# Patient Record
Sex: FEMALE | Race: WHITE | Employment: FULL TIME | URBAN - METROPOLITAN AREA
[De-identification: names, ages, dates, MRNs, and addresses within clinical notes are randomized per-mention and may not be internally consistent; named-entity substitution may affect disease eponyms.]

---

## 2017-01-17 ENCOUNTER — HOSPITAL ENCOUNTER (OUTPATIENT)
Dept: CT IMAGING | Age: 62
Discharge: HOME | End: 2017-01-17
Attending: INTERNAL MEDICINE | Admitting: INTERNAL MEDICINE

## 2017-01-17 DIAGNOSIS — C20 RECTAL CANCER METASTASIZED TO LUNG (HCC): Chronic | ICD-10-CM

## 2017-01-17 DIAGNOSIS — C18.2 MALIGNANT NEOPLASM OF ASCENDING COLON (HCC): ICD-10-CM

## 2017-01-17 DIAGNOSIS — C78.00 RECTAL CANCER METASTASIZED TO LUNG (HCC): Chronic | ICD-10-CM

## 2017-01-17 LAB
GFR AFRICAN AMERICAN: >60
GFR NON-AFRICAN AMERICAN: >60
PERFORMED ON: NORMAL
POC CREATININE: 0.6 MG/DL (ref 0.6–1.2)
POC SAMPLE TYPE: NORMAL

## 2017-01-30 ENCOUNTER — HOSPITAL ENCOUNTER (OUTPATIENT)
Dept: CT IMAGING | Age: 62
Discharge: HOME | End: 2017-01-30
Attending: INTERNAL MEDICINE | Admitting: INTERNAL MEDICINE

## 2017-01-30 VITALS
HEIGHT: 67 IN | OXYGEN SATURATION: 98 % | BODY MASS INDEX: 25.45 KG/M2 | SYSTOLIC BLOOD PRESSURE: 146 MMHG | DIASTOLIC BLOOD PRESSURE: 76 MMHG | HEART RATE: 75 BPM | RESPIRATION RATE: 16 BRPM | TEMPERATURE: 97.6 F | WEIGHT: 162.15 LBS

## 2017-01-30 DIAGNOSIS — C20 RECTAL CANCER METASTASIZED TO LUNG (HCC): Chronic | ICD-10-CM

## 2017-01-30 DIAGNOSIS — C20 MALIGNANT NEOPLASM OF RECTUM (HCC): ICD-10-CM

## 2017-01-30 DIAGNOSIS — C78.00 RECTAL CANCER METASTASIZED TO LUNG (HCC): Chronic | ICD-10-CM

## 2017-01-30 LAB
INR BLD: 0.96 (ref 0.85–1.16)
PLATELET # BLD: 101 K/UL (ref 135–450)
PROTHROMBIN TIME: 10.9 SEC (ref 9.8–13)

## 2017-01-30 RX ORDER — FENTANYL CITRATE 50 UG/ML
INJECTION, SOLUTION INTRAMUSCULAR; INTRAVENOUS DAILY PRN
Status: COMPLETED | OUTPATIENT
Start: 2017-01-30 | End: 2017-01-30

## 2017-01-30 RX ORDER — ACETAMINOPHEN 325 MG/1
650 TABLET ORAL EVERY 4 HOURS PRN
Status: DISCONTINUED | OUTPATIENT
Start: 2017-01-30 | End: 2017-01-31 | Stop reason: HOSPADM

## 2017-01-30 RX ORDER — MIDAZOLAM HYDROCHLORIDE 1 MG/ML
INJECTION INTRAMUSCULAR; INTRAVENOUS DAILY PRN
Status: COMPLETED | OUTPATIENT
Start: 2017-01-30 | End: 2017-01-30

## 2017-01-30 RX ADMIN — FENTANYL CITRATE 25 MCG: 50 INJECTION, SOLUTION INTRAMUSCULAR; INTRAVENOUS at 09:40

## 2017-01-30 RX ADMIN — MIDAZOLAM HYDROCHLORIDE 0.5 MG: 1 INJECTION INTRAMUSCULAR; INTRAVENOUS at 09:50

## 2017-01-30 RX ADMIN — MIDAZOLAM HYDROCHLORIDE 1 MG: 1 INJECTION INTRAMUSCULAR; INTRAVENOUS at 09:41

## 2017-01-30 RX ADMIN — FENTANYL CITRATE 25 MCG: 50 INJECTION, SOLUTION INTRAMUSCULAR; INTRAVENOUS at 09:50

## 2017-01-30 ASSESSMENT — PAIN - FUNCTIONAL ASSESSMENT: PAIN_FUNCTIONAL_ASSESSMENT: 0-10

## 2017-01-30 ASSESSMENT — PAIN SCALES - GENERAL: PAINLEVEL_OUTOF10: 0

## 2017-02-05 PROBLEM — C20 RECTAL CANCER (HCC): Status: ACTIVE | Noted: 2017-02-05

## 2017-02-06 ENCOUNTER — OFFICE VISIT (OUTPATIENT)
Dept: INTERNAL MEDICINE CLINIC | Age: 62
End: 2017-02-06

## 2017-02-06 VITALS
HEART RATE: 64 BPM | BODY MASS INDEX: 25.39 KG/M2 | SYSTOLIC BLOOD PRESSURE: 110 MMHG | WEIGHT: 158 LBS | HEIGHT: 66 IN | TEMPERATURE: 98.6 F | DIASTOLIC BLOOD PRESSURE: 60 MMHG

## 2017-02-06 DIAGNOSIS — J01.00 ACUTE NON-RECURRENT MAXILLARY SINUSITIS: ICD-10-CM

## 2017-02-06 DIAGNOSIS — H65.91 RIGHT NON-SUPPURATIVE OTITIS MEDIA: ICD-10-CM

## 2017-02-06 PROBLEM — H66.91 RIGHT OTITIS MEDIA: Status: ACTIVE | Noted: 2017-02-06

## 2017-02-06 PROCEDURE — 99213 OFFICE O/P EST LOW 20 MIN: CPT | Performed by: INTERNAL MEDICINE

## 2017-02-06 RX ORDER — LEVOFLOXACIN 500 MG/1
500 TABLET, FILM COATED ORAL DAILY
Qty: 10 TABLET | Refills: 0 | Status: SHIPPED | OUTPATIENT
Start: 2017-02-06 | End: 2017-02-16

## 2017-02-06 ASSESSMENT — ENCOUNTER SYMPTOMS
SHORTNESS OF BREATH: 0
COUGH: 1
CHEST TIGHTNESS: 0
WHEEZING: 0

## 2017-02-13 ENCOUNTER — TELEPHONE (OUTPATIENT)
Dept: INTERNAL MEDICINE CLINIC | Age: 62
End: 2017-02-13

## 2017-02-13 RX ORDER — FLUTICASONE PROPIONATE 50 MCG
2 SPRAY, SUSPENSION (ML) NASAL DAILY
Qty: 1 BOTTLE | Refills: 0 | Status: SHIPPED | OUTPATIENT
Start: 2017-02-13 | End: 2017-04-18 | Stop reason: ALTCHOICE

## 2017-04-07 ENCOUNTER — HOSPITAL ENCOUNTER (OUTPATIENT)
Dept: CT IMAGING | Age: 62
Discharge: HOME | End: 2017-04-07
Attending: INTERNAL MEDICINE | Admitting: INTERNAL MEDICINE

## 2017-04-07 DIAGNOSIS — C20 RECTAL CANCER (HCC): ICD-10-CM

## 2017-04-07 DIAGNOSIS — C78.00 RECTAL CANCER METASTASIZED TO LUNG (HCC): Chronic | ICD-10-CM

## 2017-04-07 DIAGNOSIS — Z71.2 ENCOUNTER TO DISCUSS TEST RESULTS: ICD-10-CM

## 2017-04-07 DIAGNOSIS — C20 RECTAL CANCER METASTASIZED TO LUNG (HCC): Chronic | ICD-10-CM

## 2017-04-07 DIAGNOSIS — C20 MALIGNANT NEOPLASM OF RECTUM (HCC): ICD-10-CM

## 2017-04-18 ENCOUNTER — OFFICE VISIT (OUTPATIENT)
Dept: INTERNAL MEDICINE CLINIC | Age: 62
End: 2017-04-18

## 2017-04-18 VITALS
OXYGEN SATURATION: 97 % | HEART RATE: 68 BPM | BODY MASS INDEX: 25.39 KG/M2 | RESPIRATION RATE: 16 BRPM | HEIGHT: 66 IN | SYSTOLIC BLOOD PRESSURE: 116 MMHG | WEIGHT: 158 LBS | DIASTOLIC BLOOD PRESSURE: 60 MMHG

## 2017-04-18 DIAGNOSIS — H10.13 ALLERGIC CONJUNCTIVITIS OF BOTH EYES: ICD-10-CM

## 2017-04-18 PROCEDURE — 99212 OFFICE O/P EST SF 10 MIN: CPT | Performed by: INTERNAL MEDICINE

## 2017-04-18 RX ORDER — OLOPATADINE HYDROCHLORIDE 1 MG/ML
1 SOLUTION/ DROPS OPHTHALMIC 2 TIMES DAILY
Qty: 1 BOTTLE | Refills: 0 | Status: SHIPPED | OUTPATIENT
Start: 2017-04-18 | End: 2017-05-18

## 2017-04-18 ASSESSMENT — ENCOUNTER SYMPTOMS
PHOTOPHOBIA: 0
EYE DISCHARGE: 0
EYE PAIN: 0
EYE ITCHING: 1
SORE THROAT: 0
EYE REDNESS: 1

## 2017-07-13 ENCOUNTER — HOSPITAL ENCOUNTER (OUTPATIENT)
Dept: CT IMAGING | Age: 62
Discharge: HOME | End: 2017-07-13
Attending: INTERNAL MEDICINE | Admitting: INTERNAL MEDICINE

## 2017-07-13 DIAGNOSIS — C20 RECTAL CANCER METASTASIZED TO LUNG (HCC): Chronic | ICD-10-CM

## 2017-07-13 DIAGNOSIS — C20 RECTAL CANCER (HCC): ICD-10-CM

## 2017-07-13 DIAGNOSIS — C78.00 RECTAL CANCER METASTASIZED TO LUNG (HCC): Chronic | ICD-10-CM

## 2017-07-13 DIAGNOSIS — C20 MALIGNANT NEOPLASM OF RECTUM (HCC): ICD-10-CM

## 2017-07-18 ENCOUNTER — TELEPHONE (OUTPATIENT)
Dept: DERMATOLOGY | Age: 62
End: 2017-07-18

## 2017-10-17 ENCOUNTER — HOSPITAL ENCOUNTER (OUTPATIENT)
Dept: CT IMAGING | Age: 62
Discharge: HOME | End: 2017-10-17
Attending: INTERNAL MEDICINE | Admitting: INTERNAL MEDICINE

## 2017-10-17 DIAGNOSIS — C20 MALIGNANT NEOPLASM OF RECTUM (HCC): ICD-10-CM

## 2017-10-17 DIAGNOSIS — Z71.2 ENCOUNTER TO DISCUSS TEST RESULTS: ICD-10-CM

## 2017-10-17 DIAGNOSIS — C20 RECTAL CANCER METASTASIZED TO LUNG (HCC): Chronic | ICD-10-CM

## 2017-10-17 DIAGNOSIS — C78.00 RECTAL CANCER METASTASIZED TO LUNG (HCC): Chronic | ICD-10-CM

## 2017-11-27 ENCOUNTER — HOSPITAL ENCOUNTER (OUTPATIENT)
Dept: CT IMAGING | Age: 62
Discharge: HOME | End: 2017-11-27
Attending: INTERNAL MEDICINE | Admitting: INTERNAL MEDICINE

## 2017-11-27 DIAGNOSIS — C20 MALIGNANT NEOPLASM OF RECTUM (HCC): ICD-10-CM

## 2017-11-27 DIAGNOSIS — C78.00 MALIGNANT NEOPLASM METASTATIC TO LUNG, UNSPECIFIED LATERALITY (HCC): ICD-10-CM

## 2018-01-02 ENCOUNTER — TELEPHONE (OUTPATIENT)
Dept: INTERNAL MEDICINE CLINIC | Age: 63
End: 2018-01-02

## 2018-01-02 NOTE — TELEPHONE ENCOUNTER
Pt said her left ear is slogged. She shama she was seen for the same problem last April. She cannot come in tomorrow. Call pt to advise [phone]. Timothy Sorto [facility].

## 2018-03-15 ENCOUNTER — HOSPITAL ENCOUNTER (OUTPATIENT)
Dept: CT IMAGING | Age: 63
Discharge: HOME | End: 2018-03-15
Attending: INTERNAL MEDICINE | Admitting: INTERNAL MEDICINE

## 2018-03-15 DIAGNOSIS — C20 MALIGNANT NEOPLASM OF RECTUM (HCC): ICD-10-CM

## 2018-03-15 DIAGNOSIS — C78.00 MALIGNANT NEOPLASM METASTATIC TO LUNG, UNSPECIFIED LATERALITY (HCC): ICD-10-CM

## 2018-03-15 DIAGNOSIS — R91.8 LUNG MASS: ICD-10-CM

## 2018-03-22 ENCOUNTER — HOSPITAL ENCOUNTER (OUTPATIENT)
Dept: CT IMAGING | Age: 63
Discharge: HOME | End: 2018-03-22
Attending: INTERNAL MEDICINE | Admitting: INTERNAL MEDICINE

## 2018-03-22 VITALS
BODY MASS INDEX: 24.08 KG/M2 | OXYGEN SATURATION: 97 % | TEMPERATURE: 98.7 F | WEIGHT: 153.44 LBS | RESPIRATION RATE: 18 BRPM | DIASTOLIC BLOOD PRESSURE: 74 MMHG | HEART RATE: 83 BPM | HEIGHT: 67 IN | SYSTOLIC BLOOD PRESSURE: 138 MMHG

## 2018-03-22 DIAGNOSIS — R91.8 LUNG MASS: ICD-10-CM

## 2018-03-22 DIAGNOSIS — C20 MALIGNANT NEOPLASM OF RECTUM (HCC): ICD-10-CM

## 2018-03-22 LAB
INR BLD: 0.99 (ref 0.85–1.15)
PLATELET # BLD: 107 K/UL (ref 135–450)
PROTHROMBIN TIME: 11.2 SEC (ref 9.6–13)

## 2018-03-22 RX ORDER — ACETAMINOPHEN 325 MG/1
650 TABLET ORAL EVERY 4 HOURS PRN
Status: DISCONTINUED | OUTPATIENT
Start: 2018-03-22 | End: 2018-03-23 | Stop reason: HOSPADM

## 2018-03-22 RX ORDER — MIDAZOLAM HYDROCHLORIDE 1 MG/ML
INJECTION INTRAMUSCULAR; INTRAVENOUS DAILY PRN
Status: COMPLETED | OUTPATIENT
Start: 2018-03-22 | End: 2018-03-22

## 2018-03-22 RX ORDER — DIPHENHYDRAMINE HYDROCHLORIDE 50 MG/ML
INJECTION INTRAMUSCULAR; INTRAVENOUS DAILY PRN
Status: COMPLETED | OUTPATIENT
Start: 2018-03-22 | End: 2018-03-22

## 2018-03-22 RX ADMIN — MIDAZOLAM HYDROCHLORIDE 1 MG: 1 INJECTION INTRAMUSCULAR; INTRAVENOUS at 13:09

## 2018-03-22 RX ADMIN — DIPHENHYDRAMINE HYDROCHLORIDE 25 MG: 50 INJECTION INTRAMUSCULAR; INTRAVENOUS at 13:07

## 2018-03-22 RX ADMIN — MIDAZOLAM HYDROCHLORIDE 1 MG: 1 INJECTION INTRAMUSCULAR; INTRAVENOUS at 13:04

## 2018-03-22 ASSESSMENT — PAIN SCALES - GENERAL
PAINLEVEL_OUTOF10: 0
PAINLEVEL_OUTOF10: 0

## 2018-03-22 ASSESSMENT — PAIN - FUNCTIONAL ASSESSMENT: PAIN_FUNCTIONAL_ASSESSMENT: 0-10

## 2018-06-14 ENCOUNTER — HOSPITAL ENCOUNTER (OUTPATIENT)
Dept: CT IMAGING | Age: 63
Discharge: HOME | End: 2018-06-14
Attending: INTERNAL MEDICINE | Admitting: INTERNAL MEDICINE

## 2018-06-14 DIAGNOSIS — C20 MALIGNANT NEOPLASM OF RECTUM (HCC): ICD-10-CM

## 2018-06-14 DIAGNOSIS — D49.0: ICD-10-CM

## 2018-06-14 LAB
GFR AFRICAN AMERICAN: >60
GFR NON-AFRICAN AMERICAN: >60
PERFORMED ON: NORMAL
POC CREATININE: 0.7 MG/DL (ref 0.6–1.2)
POC SAMPLE TYPE: NORMAL

## 2018-12-15 ENCOUNTER — HOSPITAL ENCOUNTER (OUTPATIENT)
Dept: CT IMAGING | Age: 63
Discharge: HOME | End: 2018-12-15
Payer: COMMERCIAL

## 2018-12-15 DIAGNOSIS — C20 MALIGNANT NEOPLASM OF RECTUM (HCC): ICD-10-CM

## 2018-12-15 PROCEDURE — 82565 ASSAY OF CREATININE: CPT

## 2018-12-15 PROCEDURE — 74177 CT ABD & PELVIS W/CONTRAST: CPT

## 2018-12-15 PROCEDURE — 6360000004 HC RX CONTRAST MEDICATION: Performed by: CLINICAL NURSE SPECIALIST

## 2018-12-15 RX ADMIN — IOPAMIDOL 75 ML: 755 INJECTION, SOLUTION INTRAVENOUS at 07:22

## 2018-12-15 RX ADMIN — IOHEXOL 50 ML: 240 INJECTION, SOLUTION INTRATHECAL; INTRAVASCULAR; INTRAVENOUS; ORAL at 07:22

## 2019-06-10 ENCOUNTER — HOSPITAL ENCOUNTER (OUTPATIENT)
Dept: CT IMAGING | Age: 64
Discharge: HOME | End: 2019-06-10
Payer: COMMERCIAL

## 2019-06-10 DIAGNOSIS — C20 RECTAL CANCER (HCC): ICD-10-CM

## 2019-06-10 PROCEDURE — 82565 ASSAY OF CREATININE: CPT

## 2019-06-10 PROCEDURE — 74177 CT ABD & PELVIS W/CONTRAST: CPT

## 2019-06-10 PROCEDURE — 6360000004 HC RX CONTRAST MEDICATION: Performed by: INTERNAL MEDICINE

## 2019-06-10 RX ADMIN — IOHEXOL 50 ML: 240 INJECTION, SOLUTION INTRATHECAL; INTRAVASCULAR; INTRAVENOUS; ORAL at 07:12

## 2019-06-10 RX ADMIN — IOPAMIDOL 75 ML: 755 INJECTION, SOLUTION INTRAVENOUS at 07:12

## 2020-01-11 ENCOUNTER — HOSPITAL ENCOUNTER (OUTPATIENT)
Dept: CT IMAGING | Age: 65
Discharge: HOME | End: 2020-01-11
Payer: COMMERCIAL

## 2020-01-11 LAB
GFR AFRICAN AMERICAN: >60
GFR NON-AFRICAN AMERICAN: >60
PERFORMED ON: NORMAL
POC CREATININE: 0.8 MG/DL (ref 0.6–1.2)
POC SAMPLE TYPE: NORMAL

## 2020-01-11 PROCEDURE — 82565 ASSAY OF CREATININE: CPT

## 2020-01-11 PROCEDURE — 74177 CT ABD & PELVIS W/CONTRAST: CPT

## 2020-01-11 PROCEDURE — 6360000004 HC RX CONTRAST MEDICATION: Performed by: INTERNAL MEDICINE

## 2020-01-11 RX ADMIN — IOPAMIDOL 75 ML: 755 INJECTION, SOLUTION INTRAVENOUS at 08:36

## 2020-01-11 RX ADMIN — IOHEXOL 50 ML: 240 INJECTION, SOLUTION INTRATHECAL; INTRAVASCULAR; INTRAVENOUS; ORAL at 08:36

## 2020-03-12 ENCOUNTER — TELEPHONE (OUTPATIENT)
Dept: INTERNAL MEDICINE CLINIC | Age: 65
End: 2020-03-12

## 2020-08-20 ENCOUNTER — HOSPITAL ENCOUNTER (OUTPATIENT)
Dept: CT IMAGING | Age: 65
Discharge: HOME | End: 2020-08-20
Payer: COMMERCIAL

## 2020-08-20 PROCEDURE — 74177 CT ABD & PELVIS W/CONTRAST: CPT

## 2020-08-20 PROCEDURE — 82565 ASSAY OF CREATININE: CPT

## 2020-08-20 PROCEDURE — 6360000004 HC RX CONTRAST MEDICATION: Performed by: INTERNAL MEDICINE

## 2020-08-20 RX ADMIN — IOHEXOL 50 ML: 240 INJECTION, SOLUTION INTRATHECAL; INTRAVASCULAR; INTRAVENOUS; ORAL at 07:07

## 2020-08-20 RX ADMIN — IOPAMIDOL 75 ML: 755 INJECTION, SOLUTION INTRAVENOUS at 07:07

## 2020-10-09 ENCOUNTER — OFFICE VISIT (OUTPATIENT)
Dept: INTERNAL MEDICINE CLINIC | Age: 65
End: 2020-10-09
Payer: COMMERCIAL

## 2020-10-09 ENCOUNTER — HOSPITAL ENCOUNTER (OUTPATIENT)
Dept: GENERAL RADIOLOGY | Age: 65
Discharge: HOME | End: 2020-10-09
Payer: COMMERCIAL

## 2020-10-09 ENCOUNTER — HOSPITAL ENCOUNTER (OUTPATIENT)
Age: 65
Discharge: HOME | End: 2020-10-09
Payer: COMMERCIAL

## 2020-10-09 VITALS
OXYGEN SATURATION: 100 % | SYSTOLIC BLOOD PRESSURE: 120 MMHG | BODY MASS INDEX: 24.68 KG/M2 | TEMPERATURE: 99.5 F | RESPIRATION RATE: 16 BRPM | WEIGHT: 157.6 LBS | DIASTOLIC BLOOD PRESSURE: 66 MMHG | HEART RATE: 82 BPM

## 2020-10-09 PROBLEM — M54.2 ACUTE NECK PAIN: Status: ACTIVE | Noted: 2020-10-09

## 2020-10-09 LAB
BASOPHILS ABSOLUTE: 0 K/UL (ref 0–0.2)
BASOPHILS RELATIVE PERCENT: 0.3 %
EOSINOPHILS ABSOLUTE: 0 K/UL (ref 0–0.6)
EOSINOPHILS RELATIVE PERCENT: 0.5 %
HCT VFR BLD CALC: 40 % (ref 36–48)
HEMOGLOBIN: 13.8 G/DL (ref 12–16)
LYMPHOCYTES ABSOLUTE: 0.8 K/UL (ref 1–5.1)
LYMPHOCYTES RELATIVE PERCENT: 10.3 %
MCH RBC QN AUTO: 31.4 PG (ref 26–34)
MCHC RBC AUTO-ENTMCNC: 34.4 G/DL (ref 31–36)
MCV RBC AUTO: 91.4 FL (ref 80–100)
MONOCYTES ABSOLUTE: 0.7 K/UL (ref 0–1.3)
MONOCYTES RELATIVE PERCENT: 9.1 %
NEUTROPHILS ABSOLUTE: 6.1 K/UL (ref 1.7–7.7)
NEUTROPHILS RELATIVE PERCENT: 79.8 %
PDW BLD-RTO: 12.2 % (ref 12.4–15.4)
PLATELET # BLD: 117 K/UL (ref 135–450)
PMV BLD AUTO: 8.5 FL (ref 5–10.5)
RBC # BLD: 4.38 M/UL (ref 4–5.2)
WBC # BLD: 7.6 K/UL (ref 4–11)

## 2020-10-09 PROCEDURE — G8420 CALC BMI NORM PARAMETERS: HCPCS | Performed by: INTERNAL MEDICINE

## 2020-10-09 PROCEDURE — G8400 PT W/DXA NO RESULTS DOC: HCPCS | Performed by: INTERNAL MEDICINE

## 2020-10-09 PROCEDURE — G8427 DOCREV CUR MEDS BY ELIG CLIN: HCPCS | Performed by: INTERNAL MEDICINE

## 2020-10-09 PROCEDURE — 1123F ACP DISCUSS/DSCN MKR DOCD: CPT | Performed by: INTERNAL MEDICINE

## 2020-10-09 PROCEDURE — 36415 COLL VENOUS BLD VENIPUNCTURE: CPT | Performed by: INTERNAL MEDICINE

## 2020-10-09 PROCEDURE — 99213 OFFICE O/P EST LOW 20 MIN: CPT | Performed by: INTERNAL MEDICINE

## 2020-10-09 PROCEDURE — G8484 FLU IMMUNIZE NO ADMIN: HCPCS | Performed by: INTERNAL MEDICINE

## 2020-10-09 PROCEDURE — 1036F TOBACCO NON-USER: CPT | Performed by: INTERNAL MEDICINE

## 2020-10-09 PROCEDURE — 4040F PNEUMOC VAC/ADMIN/RCVD: CPT | Performed by: INTERNAL MEDICINE

## 2020-10-09 PROCEDURE — 72040 X-RAY EXAM NECK SPINE 2-3 VW: CPT

## 2020-10-09 PROCEDURE — 3017F COLORECTAL CA SCREEN DOC REV: CPT | Performed by: INTERNAL MEDICINE

## 2020-10-09 PROCEDURE — 1090F PRES/ABSN URINE INCON ASSESS: CPT | Performed by: INTERNAL MEDICINE

## 2020-10-09 RX ORDER — AMOXICILLIN 500 MG/1
500 CAPSULE ORAL 3 TIMES DAILY
Qty: 30 CAPSULE | Refills: 0 | Status: SHIPPED | OUTPATIENT
Start: 2020-10-09 | End: 2020-10-19

## 2020-10-09 RX ORDER — DICLOFENAC SODIUM 75 MG/1
75 TABLET, DELAYED RELEASE ORAL 2 TIMES DAILY
Qty: 60 TABLET | Refills: 3 | Status: SHIPPED | OUTPATIENT
Start: 2020-10-09 | End: 2021-07-26

## 2020-10-09 SDOH — ECONOMIC STABILITY: FOOD INSECURITY: WITHIN THE PAST 12 MONTHS, THE FOOD YOU BOUGHT JUST DIDN'T LAST AND YOU DIDN'T HAVE MONEY TO GET MORE.: NEVER TRUE

## 2020-10-09 SDOH — ECONOMIC STABILITY: TRANSPORTATION INSECURITY
IN THE PAST 12 MONTHS, HAS THE LACK OF TRANSPORTATION KEPT YOU FROM MEDICAL APPOINTMENTS OR FROM GETTING MEDICATIONS?: NO

## 2020-10-09 SDOH — ECONOMIC STABILITY: FOOD INSECURITY: WITHIN THE PAST 12 MONTHS, YOU WORRIED THAT YOUR FOOD WOULD RUN OUT BEFORE YOU GOT MONEY TO BUY MORE.: NEVER TRUE

## 2020-10-09 SDOH — ECONOMIC STABILITY: INCOME INSECURITY: HOW HARD IS IT FOR YOU TO PAY FOR THE VERY BASICS LIKE FOOD, HOUSING, MEDICAL CARE, AND HEATING?: NOT HARD AT ALL

## 2020-10-09 SDOH — ECONOMIC STABILITY: TRANSPORTATION INSECURITY
IN THE PAST 12 MONTHS, HAS LACK OF TRANSPORTATION KEPT YOU FROM MEETINGS, WORK, OR FROM GETTING THINGS NEEDED FOR DAILY LIVING?: NO

## 2020-10-09 ASSESSMENT — ENCOUNTER SYMPTOMS
CHEST TIGHTNESS: 0
GASTROINTESTINAL NEGATIVE: 1
COUGH: 0

## 2020-10-09 ASSESSMENT — PATIENT HEALTH QUESTIONNAIRE - PHQ9
2. FEELING DOWN, DEPRESSED OR HOPELESS: 0
1. LITTLE INTEREST OR PLEASURE IN DOING THINGS: 0
SUM OF ALL RESPONSES TO PHQ9 QUESTIONS 1 & 2: 0
SUM OF ALL RESPONSES TO PHQ QUESTIONS 1-9: 0
SUM OF ALL RESPONSES TO PHQ QUESTIONS 1-9: 0

## 2020-10-09 NOTE — PATIENT INSTRUCTIONS
Xray cervicale spine   Monitor temperature at home     Call for elevation temp over 101 or develops any new symptoms  Start on diclofenac and take 2 times daily with food  Start on amoxicillin and take 3 times daily for 10 days  Call if not getting better

## 2020-10-09 NOTE — PROGRESS NOTES
Neurological:      Mental Status: She is alert and oriented to person, place, and time. Assessment:      Neck  pain appears to be from arthritis and or muscle strain   Has low grade fever with maxillary sinus tenderness-possible developing infection?       Plan:      Xray cervicale spine   Monitor temperature at home     Call for elevation temp over 101 or develops any new symptoms  Start on diclofenac and take 2 times daily with food  Start on amoxicillin and take 3 times daily for 10 days  Call if not getting better  Byron Arias MD

## 2020-12-03 ENCOUNTER — TELEPHONE (OUTPATIENT)
Dept: INTERNAL MEDICINE CLINIC | Age: 65
End: 2020-12-03

## 2020-12-15 ENCOUNTER — TELEPHONE (OUTPATIENT)
Dept: INTERNAL MEDICINE CLINIC | Age: 65
End: 2020-12-15

## 2020-12-15 RX ORDER — DOXYCYCLINE HYCLATE 100 MG
100 TABLET ORAL 2 TIMES DAILY
Qty: 14 TABLET | Refills: 0 | Status: SHIPPED | OUTPATIENT
Start: 2020-12-15 | End: 2020-12-22

## 2020-12-15 NOTE — TELEPHONE ENCOUNTER
----- Message from Emiliano Ramos sent at 12/15/2020  2:09 PM EST -----  Subject: Appointment Request    Reason for Call: Routine (Patient Request) No Script    QUESTIONS  Type of Appointment? Established Patient  Reason for appointment request? Available appointments did not meet   patient need  Additional Information for Provider? Patient has right ear pain and is not   able to do a Virtual visit because she doesn't have a smartphone and will   be at work. She is recovering from covid and was hoping to get medicine   for her ear.  ---------------------------------------------------------------------------  --------------  CALL BACK INFO  What is the best way for the office to contact you? OK to leave message on   voicemail  Preferred Call Back Phone Number? [phone]  ---------------------------------------------------------------------------  --------------  SCRIPT ANSWERS  Relationship to Patient? Self  Appointment reason? Symptomatic  Select script based on patient symptoms? Adult No Script  (Is the patient requesting to see the provider for a procedure?)? No  (Is the patient requesting to see the provider urgently  today or   tomorrow. )? No  Have you been diagnosed with   tested for   or told that you are suspected of having COVID-19 (Coronavirus)? Yes  Did your symptoms begin or have you been tested for COVID-19 in the last   10 days? No  Have you had a fever or taken medication to treat a fever within the past   3 days? No  Have you had a cough   shortness of breath or flu-like symptoms within the past 3 days?  Yes

## 2020-12-15 NOTE — TELEPHONE ENCOUNTER
No fever, ear pain, cough at night. Would like a rx for ear pain. Can't do a virtual.  Was positive for covid Nov 20.     [facility] - [phone]

## 2020-12-15 NOTE — TELEPHONE ENCOUNTER
I do not know what is causing her ear pain and may be from infection and may be not   Will send an antibiotic and if not better,need to be seen in office

## 2020-12-28 ENCOUNTER — OFFICE VISIT (OUTPATIENT)
Dept: INTERNAL MEDICINE CLINIC | Age: 65
End: 2020-12-28
Payer: COMMERCIAL

## 2020-12-28 VITALS
DIASTOLIC BLOOD PRESSURE: 80 MMHG | SYSTOLIC BLOOD PRESSURE: 130 MMHG | WEIGHT: 163.6 LBS | OXYGEN SATURATION: 99 % | HEART RATE: 74 BPM | BODY MASS INDEX: 25.62 KG/M2 | TEMPERATURE: 98.1 F

## 2020-12-28 PROBLEM — M25.50 POLYARTHRALGIA: Status: ACTIVE | Noted: 2020-12-28

## 2020-12-28 PROBLEM — M54.12 CERVICAL RADICULOPATHY: Status: ACTIVE | Noted: 2020-12-28

## 2020-12-28 LAB
BASOPHILS ABSOLUTE: 0 K/UL (ref 0–0.2)
BASOPHILS RELATIVE PERCENT: 0.2 %
C-REACTIVE PROTEIN: 17.9 MG/L (ref 0–5.1)
EOSINOPHILS ABSOLUTE: 0 K/UL (ref 0–0.6)
EOSINOPHILS RELATIVE PERCENT: 0.8 %
HCT VFR BLD CALC: 37.6 % (ref 36–48)
HEMOGLOBIN: 12.3 G/DL (ref 12–16)
LYMPHOCYTES ABSOLUTE: 0.9 K/UL (ref 1–5.1)
LYMPHOCYTES RELATIVE PERCENT: 17.3 %
MCH RBC QN AUTO: 30.6 PG (ref 26–34)
MCHC RBC AUTO-ENTMCNC: 32.7 G/DL (ref 31–36)
MCV RBC AUTO: 93.5 FL (ref 80–100)
MONOCYTES ABSOLUTE: 0.4 K/UL (ref 0–1.3)
MONOCYTES RELATIVE PERCENT: 8.1 %
NEUTROPHILS ABSOLUTE: 4 K/UL (ref 1.7–7.7)
NEUTROPHILS RELATIVE PERCENT: 73.6 %
PDW BLD-RTO: 12.9 % (ref 12.4–15.4)
PLATELET # BLD: 156 K/UL (ref 135–450)
PMV BLD AUTO: 8.7 FL (ref 5–10.5)
RBC # BLD: 4.02 M/UL (ref 4–5.2)
RHEUMATOID FACTOR: <10 IU/ML
WBC # BLD: 5.4 K/UL (ref 4–11)

## 2020-12-28 PROCEDURE — 1123F ACP DISCUSS/DSCN MKR DOCD: CPT | Performed by: INTERNAL MEDICINE

## 2020-12-28 PROCEDURE — 36415 COLL VENOUS BLD VENIPUNCTURE: CPT | Performed by: INTERNAL MEDICINE

## 2020-12-28 PROCEDURE — 99213 OFFICE O/P EST LOW 20 MIN: CPT | Performed by: INTERNAL MEDICINE

## 2020-12-28 PROCEDURE — 1036F TOBACCO NON-USER: CPT | Performed by: INTERNAL MEDICINE

## 2020-12-28 PROCEDURE — G8484 FLU IMMUNIZE NO ADMIN: HCPCS | Performed by: INTERNAL MEDICINE

## 2020-12-28 PROCEDURE — 3017F COLORECTAL CA SCREEN DOC REV: CPT | Performed by: INTERNAL MEDICINE

## 2020-12-28 PROCEDURE — 4040F PNEUMOC VAC/ADMIN/RCVD: CPT | Performed by: INTERNAL MEDICINE

## 2020-12-28 PROCEDURE — G8417 CALC BMI ABV UP PARAM F/U: HCPCS | Performed by: INTERNAL MEDICINE

## 2020-12-28 PROCEDURE — G8427 DOCREV CUR MEDS BY ELIG CLIN: HCPCS | Performed by: INTERNAL MEDICINE

## 2020-12-28 PROCEDURE — G8400 PT W/DXA NO RESULTS DOC: HCPCS | Performed by: INTERNAL MEDICINE

## 2020-12-28 PROCEDURE — 1090F PRES/ABSN URINE INCON ASSESS: CPT | Performed by: INTERNAL MEDICINE

## 2020-12-28 RX ORDER — PREDNISONE 10 MG/1
TABLET ORAL
Qty: 32 TABLET | Refills: 0 | Status: SHIPPED | OUTPATIENT
Start: 2020-12-28 | End: 2021-02-22 | Stop reason: ALTCHOICE

## 2020-12-28 ASSESSMENT — ENCOUNTER SYMPTOMS
WHEEZING: 0
SORE THROAT: 0
COUGH: 0
CHEST TIGHTNESS: 0
SHORTNESS OF BREATH: 0

## 2020-12-28 NOTE — PATIENT INSTRUCTIONS
Start her on steroids and see me in 10 days and if not getting better,will need MRI scan     Will check labs and see in 10 days

## 2020-12-28 NOTE — PROGRESS NOTES
Subjective:      Patient ID: Dena Gomez is a 72 y.o. female. HPI  Recovered from Covid  She has onset of pain Wednesday last week when lifting a case of water bottles-pain is in between shoulder blades and shoulders and increased with movements and has no tingling or numbness or weakness in extremities  Resolved with arthritic medications and symptoms resolved after 1 day and 1 day later has pain right side of chest but not increased with breathing and had no dyspnea fever or chills and resolved after 1 day  1 day later had left hip pain and local heat and Tylenol helped and symptoms resolved after 1 day  Today has pain in right shoulder blade and is sharp and pain is 7 of 10 and took diclofenac and did not help symptoms  Xray in 10.20 showed multilevel DJD ,severe at C 6-7  Has no symptoms with exertion   Review of Systems   Constitutional: Negative for chills and fever. HENT: Negative for sore throat. Respiratory: Negative for cough, chest tightness, shortness of breath and wheezing. Cardiovascular: Negative for chest pain, palpitations and leg swelling. Neurological: Negative for dizziness, light-headedness and headaches. Objective:   Physical Exam  Vitals signs and nursing note reviewed. Constitutional:       General: She is not in acute distress. Eyes:      Extraocular Movements: Extraocular movements intact. Conjunctiva/sclera: Conjunctivae normal.      Pupils: Pupils are equal, round, and reactive to light. Neck:      Thyroid: No thyromegaly. Vascular: No carotid bruit or JVD. Cardiovascular:      Rate and Rhythm: Normal rate and regular rhythm. Heart sounds: Normal heart sounds. No murmur. No gallop. Pulmonary:      Effort: No respiratory distress. Breath sounds: Normal breath sounds. No wheezing, rhonchi or rales. Chest:      Chest wall: No tenderness.    Musculoskeletal: Comments: Has decreased rom c-spine with both lateral movements and has no increase or radiated pain with extension and flexion oh neck  Has no c-spine or t-spine -spinal or paraspinal tenderness. Has normal reflexes in both UEs and has no muscle weakness   Has full rom both shoulders with out pain and has no tenderness of ac -capsular ar deltoid area tenderness    Lymphadenopathy:      Cervical: No cervical adenopathy. Neurological:      General: No focal deficit present. Mental Status: She is alert and oriented to person, place, and time.          Assessment:      Symptoms  are suggestive of cervical radiculaopthy      Plan:      Start her on steroids and see me in 10 days and if not getting better,will need MRI scan     Will check labs and see in 10 days    Amberly Carias MD

## 2020-12-29 LAB — SEDIMENTATION RATE, ERYTHROCYTE: 21 MM/HR (ref 0–30)

## 2021-02-17 ENCOUNTER — TELEPHONE (OUTPATIENT)
Dept: WOUND CARE | Age: 66
End: 2021-02-17

## 2021-02-22 ENCOUNTER — HOSPITAL ENCOUNTER (OUTPATIENT)
Dept: WOUND CARE | Age: 66
Discharge: HOME | End: 2021-02-22
Payer: MEDICARE

## 2021-02-22 VITALS
BODY MASS INDEX: 25.4 KG/M2 | TEMPERATURE: 97.9 F | WEIGHT: 161.82 LBS | DIASTOLIC BLOOD PRESSURE: 70 MMHG | HEIGHT: 67 IN | SYSTOLIC BLOOD PRESSURE: 138 MMHG | HEART RATE: 85 BPM | RESPIRATION RATE: 16 BRPM

## 2021-02-22 DIAGNOSIS — Z43.3 COLOSTOMY CARE (HCC): ICD-10-CM

## 2021-02-22 PROCEDURE — 99202 OFFICE O/P NEW SF 15 MIN: CPT

## 2021-02-22 PROCEDURE — 99204 OFFICE O/P NEW MOD 45 MIN: CPT | Performed by: NURSE PRACTITIONER

## 2021-02-22 ASSESSMENT — PAIN SCALES - GENERAL: PAINLEVEL_OUTOF10: 0

## 2021-02-22 NOTE — PLAN OF CARE
Patient Name:  Freddy Powers  YOB: 1955  Today's Date:  February 22, 2021  Medical Record Number:  [id number]  Provider:    [address] [place] [address] [address]   Appointment Treatment Guidelines        The [address] [place] [address] [address] Appointment Treatment Guidelines were reviewed on February 22, 2021 with the patient. Ms. Baljit Rocio understanding of the [address] [place] [address] [address] Appointment Treatment Guidelines.       Electronically signed by Bakari King RN on 2/22/21 at 3:11 PM EST

## 2021-02-23 PROBLEM — Z43.3 COLOSTOMY CARE (HCC): Status: ACTIVE | Noted: 2021-02-23

## 2021-02-23 NOTE — PROGRESS NOTES
[facility] Outpatient Ostomy Consult Note      NAME:  Margaret Aguirre 81. RECORD NUMBER:  [id number]  AGE: 72 y.o. GENDER:  female  :  1955  TODAY'S DATE:  2021    Subjective       Chief Complaint   Patient presents with    Wound Check     Initial ostomy visit - states dx with rectal CA . Had chemo then ostomy in . Having trouble with leaking and abrasion since 2021. HISTORY of PRESENT ILLNESS HPI     Gris Gordon is a 72 y.o. female New patient referred by self, who presents today for ostomy/stoma evaluation. Pt had loop colostomy in  @ [facility] and is independent in all aspects of care. She states she has gained some weight and noticing leaking issues now. Getting about 1-2 day wear time, skin irritation. Her original surgeon has since moved to another city and she does not have a new surgeon. Pt states her ostomy is reversible but was not recommended by Dr. Alicia Bettencourt. Dr Lewis Barker is her GI doctor. Pt works full time. Pt has used a precut pouch cut in a Teller for \"awhile\". Has never gotten more than 3 day wear time.    Pouching/Skin Issues: leaking and skin irritation  Current Pouching System: one-piece- convex drainable  History of Ostomy:  Loop Colostomy/Surgeon: Dr. Reggie Alvarez  Wound/Ulcer Pain Timing/Severity: mild  Quality of pain: burning  Severity:  1 / 10   Modifying Factors: leaking  Associated Signs/Symptoms: erythema    PAST MEDICAL HISTORY        Diagnosis Date    Acute appendicitis 2013    Arthritis     [facility] [address]     Rectal, lung    Depression     Malignant neoplasm of rectum ([facility] [address] 2014       PAST SURGICAL HISTORY    Past Surgical History:   Procedure Laterality Date    ABDOMEN SURGERY      rectal ca    APPENDECTOMY      LAPAROSCOPIC APPENDECTOMY N/A 13    attempted open appy done    MANDIBLE SURGERY      overbite repair    SC COLOSTOMY      TUNNELED VENOUS PORT PLACEMENT Left 2012    Smart Port    VARICOSE VEIN SURGERY Left        FAMILY HISTORY    Family History   Problem Relation Age of Onset    Heart Disease Mother     High Cholesterol Mother     High Cholesterol Father     Cancer Father         prostate    Diabetes Father        SOCIAL HISTORY    Social History     Tobacco Use    Smoking status: Never Smoker    Smokeless tobacco: Never Used   Substance Use Topics    Alcohol use: Yes     Alcohol/week: 1.0 standard drinks     Types: 1 Glasses of wine per week     Comment: socially    Drug use: No       ALLERGIES    Allergies   Allergen Reactions    Meperidine Nausea Only    Fentanyl Itching and Nausea And Vomiting       MEDICATIONS    Current Outpatient Medications on File Prior to Encounter   Medication Sig Dispense Refill    diclofenac (VOLTAREN) 75 MG EC tablet Take 1 tablet by mouth 2 times daily 60 tablet 3    citalopram (CELEXA) 40 MG tablet TAKE ONE TABLET BY MOUTH ONCE DAILY 30 tablet 3    Multiple Vitamins-Minerals (THERAPEUTIC MULTIVITAMIN-MINERALS) tablet Take 1 tablet by mouth daily       No current facility-administered medications on file prior to encounter. REVIEW OF SYSTEMS    Pertinent items are noted in HPI.     Objective    /70   Pulse 85   Temp 97.9 °F (36.6 °C) (Temporal)   Resp 16   Ht 5' 7\" (1.702 m)   Wt 161 lb 13.1 oz (73.4 kg)   LMP 05/20/2013   BMI 25.34 kg/m²     Wt Readings from Last 3 Encounters:   02/22/21 161 lb 13.1 oz (73.4 kg)   12/28/20 163 lb 9.6 oz (74.2 kg)   10/09/20 157 lb 9.6 oz (71.5 kg)       PHYSICAL EXAM    General Appearance: alert and oriented to person, place and time and with anxious affect  Skin: warm and dry  Head: normocephalic and atraumatic  Eyes: pupils equal, round, and reactive to light  Pulmonary/Chest:  normal air movement, no respiratory distress  Cardiovascular: normal rate and regular rhythm    Ostomy Type: loop colostomy with soft formed stool    Stoma Assessment: red moist and slightly protruding, stoma measures 1 1/4\" x 1 cream to irritated skin surrounding stoma and work into skin until no longer able to feel cream.  [] Apply antifungal powder to irritated skin surrounding stoma, seal in with barrier film; and repeat  [] Other: ____________________________________    Application of Pouch            LET'S PREPARE    [x] Change your pouch every 3-4 days or when leaking. [x] Gather supplies needed to change pouch wafer. [x] Assemble new pouch system before removing old one. [x] Using the measuring guide or pattern, trace size of opening onto back new pouch system. [x] Cut out opening. [x] Remove the control backing and set aside assembled pouch  [x] Remove worn appliance by gently pulling away from skin. Use Adhesive Remover as directed  [x] Look at back of the pouch before throwing away to see how it is worn. [x] Wash skin with warm water, rinse and pat dry. [x] Inspect  skin for redness or irritation. LET'S APPLY      [x] Apply  barrier  rings around stoma. May apply extra dust of stoma powder after applying ring. [x] Apply wafer/pouch system over stoma and press down firmly starting around stoma and working outward. [x] Remove control backing paper tape border if applicable and press to skin. []                                        (If have a 2 -piece appliance, attach new pouch to wafer. This does not apply to you)  [x] Close the bottom of pouch. []                    Apply barrier extenders to outside edges of pouch. (does not apply to you)  []                       Apply belt if using, (does not apply to you)  [x] Lay/Sit quietly for 15-20 min to allow adhesives to mold to skin. [] Other: _____________________________________    Emptying Pouch  Colostomy/Ileostomy  [x] Empty  pouch when 1/3 full of gas, stool. Clean bottom edge with damp toilet paper or bathroom wipe and close pouch. [x] For non-drainable pouch - remove when 1/2 full and throw away.   Clean wafer flange (ring) with toilet tissue or damp paper towel before reapplying new pouch      Other:  [x] Shower or Swim Care Pat pouch dry, Use hair dryer on cool setting to dry back of pouch and border and Reapply barrier extenders or tape. [x] Odor Control with deodorizer into bottom of pouch after each emptying  [x] Lubricating gel to pouch to help emptying of thick stool  [] Other: See additional instructions      Contact Ostomy Care Nurse Practitioner  [x] leaking issues and skin irritation       FOLLOW UP CARE:           Return Appointment:  ? DME/Wound Dressing Supplies Provided by: James Hoang  ? (Please call them directly to reorder supplies when you start to run low on your supplies)     · ECF or Home Healthcare:      · Return Appointment: With Mitcheal Bloch CNP  in  [address] [place] [address])  [] Wound Assessment with a nurse on:                 [] Orders placed during your visit:       : RUTH       Electronically signed by Armen Claros RN on 2/22/2021 at [address]: Should you experience any significant changes in your wound(s) or have questions about your wound care, please contact the [address] [place] [address] at [address] 8:30 am - 4:30 pm and Friday 8:30 am - 1:00 pm.  If you need help with your wound outside these hours and cannot wait until we are again available, contact your PCP or go to the hospital emergency room. PLEASE NOTE: IF YOU ARE UNABLE TO OBTAIN WOUND SUPPLIES, CONTINUE TO USE THE SUPPLIES YOU HAVE AVAILABLE UNTIL YOU ARE ABLE TO REACH US. IT IS MOST IMPORTANT TO KEEP THE WOUND COVERED AT ALL TIMES.       Provider Signature/Date/Time:_____________________________________________    Mitcheal Bloch, APRN, MSN, RN, CWON-AP  [phone]                     Electronically signed by SCAR Odom CNP on 2/23/2021 at 12:20 PM

## 2021-03-01 ENCOUNTER — HOSPITAL ENCOUNTER (OUTPATIENT)
Dept: WOUND CARE | Age: 66
Discharge: HOME | End: 2021-03-01
Payer: MEDICARE

## 2021-03-13 ENCOUNTER — HOSPITAL ENCOUNTER (OUTPATIENT)
Dept: CT IMAGING | Age: 66
Discharge: HOME | End: 2021-03-13
Payer: MEDICARE

## 2021-03-13 DIAGNOSIS — C20 MALIGNANT NEOPLASM OF RECTUM (HCC): ICD-10-CM

## 2021-03-13 PROCEDURE — 82565 ASSAY OF CREATININE: CPT

## 2021-03-13 PROCEDURE — 6360000004 HC RX CONTRAST MEDICATION: Performed by: INTERNAL MEDICINE

## 2021-03-13 PROCEDURE — 74177 CT ABD & PELVIS W/CONTRAST: CPT

## 2021-03-13 RX ADMIN — IOPAMIDOL 75 ML: 755 INJECTION, SOLUTION INTRAVENOUS at 09:19

## 2021-03-13 RX ADMIN — IOHEXOL 50 ML: 240 INJECTION, SOLUTION INTRATHECAL; INTRAVASCULAR; INTRAVENOUS; ORAL at 09:18

## 2021-03-15 ENCOUNTER — HOSPITAL ENCOUNTER (OUTPATIENT)
Dept: WOUND CARE | Age: 66
Discharge: HOME | End: 2021-03-15
Payer: MEDICARE

## 2021-03-15 VITALS
RESPIRATION RATE: 16 BRPM | TEMPERATURE: 96.6 F | DIASTOLIC BLOOD PRESSURE: 65 MMHG | HEART RATE: 77 BPM | SYSTOLIC BLOOD PRESSURE: 117 MMHG

## 2021-03-15 DIAGNOSIS — Z43.3 COLOSTOMY CARE (HCC): Primary | ICD-10-CM

## 2021-03-15 PROCEDURE — 99214 OFFICE O/P EST MOD 30 MIN: CPT | Performed by: NURSE PRACTITIONER

## 2021-03-15 PROCEDURE — 99211 OFF/OP EST MAY X REQ PHY/QHP: CPT

## 2021-03-16 NOTE — PROGRESS NOTES
[facility] Outpatient Ostomy Consult Note      NAME:  Margaret Aguirre 81. RECORD NUMBER:  [id number]  AGE: 72 y.o. GENDER:  female  :  1955  TODAY'S DATE:  3/16/2021    Subjective       Chief Complaint   Patient presents with    Other     Follow Up on Ostomy         HISTORY of PRESENT ILLNESS HPI   Ashwini King is a 72 y.o. female patient referred by self, who presents today for ostomy/stoma evaluation. Pt had loop colostomy in 2013 @ [facility] and is independent in all aspects of care. She states she has gained 30 lbs in last 4 years and noticing leaking issues now. Getting about 1-2 day wear time, skin irritation. Her original surgeon has since moved to another city and she does not have a new surgeon. Pt asked for list of colorectal surgeons in [place] and this was given to her. Pt states her ostomy is reversible but was not recommended by Dr. Geno Ac. Dr Pfeiffer Seen is her GI doctor. Pt works full time. Pt using the convex pouch provided to her at last visit and states those are working well for her, getting 3 day wear time, but still having peristomal irritation with burning.   Pouching/Skin Issues: leaking and skin irritation  Current Pouching System: one-piece- convex drainable  History of Ostomy:  Loop Colostomy/Surgeon: Dr. Shanta Oreilly  Wound/Ulcer Pain Timing/Severity: mild  Quality of pain: burning  Severity:  1 / 10   Modifying Factors: leaking  Associated Signs/Symptoms: erythema  PAST MEDICAL HISTORY        Diagnosis Date    Acute appendicitis 2013    Arthritis     Cancer ([facility]     Rectal, lung    Colostomy care ([facility] 2021    Depression     Malignant neoplasm of rectum ([address] 2014    Peristomal skin complication        PAST SURGICAL HISTORY    Past Surgical History:   Procedure Laterality Date    ABDOMEN SURGERY      rectal ca    APPENDECTOMY      LAPAROSCOPIC APPENDECTOMY N/A 13    attempted open appy done    MANDIBLE SURGERY overbite repair    SD COLOSTOMY      TUNNELED VENOUS PORT PLACEMENT Left 01/30/2012    Smart Port    VARICOSE VEIN SURGERY Left        FAMILY HISTORY    Family History   Problem Relation Age of Onset    Heart Disease Mother     High Cholesterol Mother     High Cholesterol Father     Cancer Father         prostate    Diabetes Father        SOCIAL HISTORY    Social History     Tobacco Use    Smoking status: Never Smoker    Smokeless tobacco: Never Used   Substance Use Topics    Alcohol use: Yes     Alcohol/week: 1.0 standard drinks     Types: 1 Glasses of wine per week     Comment: socially    Drug use: No       ALLERGIES    Allergies   Allergen Reactions    Meperidine Nausea Only    Fentanyl Itching and Nausea And Vomiting       MEDICATIONS    Current Outpatient Medications on File Prior to Encounter   Medication Sig Dispense Refill    diclofenac (VOLTAREN) 75 MG EC tablet Take 1 tablet by mouth 2 times daily 60 tablet 3    citalopram (CELEXA) 40 MG tablet TAKE ONE TABLET BY MOUTH ONCE DAILY 30 tablet 3    Multiple Vitamins-Minerals (THERAPEUTIC MULTIVITAMIN-MINERALS) tablet Take 1 tablet by mouth daily       No current facility-administered medications on file prior to encounter. REVIEW OF SYSTEMS    Pertinent items are noted in HPI.     Objective    /65   Pulse 77   Temp 96.6 °F (35.9 °C) (Temporal)   Resp 16   LMP 05/20/2013     Wt Readings from Last 3 Encounters:   02/22/21 161 lb 13.1 oz (73.4 kg)   12/28/20 163 lb 9.6 oz (74.2 kg)   10/09/20 157 lb 9.6 oz (71.5 kg)       PHYSICAL EXAM    General Appearance: alert and oriented to person, place and time, well-developed and well-nourished, in no acute distress  Skin: warm and dry  Head: normocephalic and atraumatic  Eyes: pupils equal, round, and reactive to light  Pulmonary/Chest:  normal air movement, no respiratory distress  Cardiovascular: normal rate and regular rhythm    Ostomy Type: colostomy, loop with soft formed Arland Mom stool.    Stoma Assessment: red, moist and protruding, noting stoma protrudes with function and decreases with rest. Os functions at skin level at 7 o'clock, so stool collects at right side. Stoma measures 1 1/4 x 1 3/4\" at largest.      Peristomal Skin Assessment: reducible peristomal hernia, skin creases soft. Plan   Patient examined and evaluated and still noting stool sitting against skin at right side. Discussed with pt her goals for pouching and the possible addition of a belt to get a better wear time. Pt does not like a belt and would like to see if she could get a precut pouch. Will try to get her samples of a precut pouch and she will look into cost of pre-cut for an oval shape which has a charge by [employer]  Pt in agreement with plan of care and will follow-up in one week. Please see attached Discharge Instructions    Written patient dismissal instructions given to patient and signed by patient or POA. Discharge Instructions            [address] [place] [address] JESSICA Morrissey 67   [phone]        Name: Ashwini King  : 1955   AGE: 72 y.o. MRN: [id number]  Today's Date: 3/15/2021     Ostomy skin care  Cleanse skin prior to applying a new pouch/wafer with:  [x]? Water             []? Cleanse skin with Mild Soap & Water  [x]? May Shower    []? Other:        Clinic ostomy size:  1 1/4 & 1 3/4     Pouch/Wafer/Accessory Product Numbers        [x]? Appliance: 1 piece Koko  convex  [x]? Product Numbers: #  [id number]  [x]? Other: Accessories:        [x]? Barrier Film                                                                   [x]? Barrier Ring - flat     [place] slim # [id number]          [x]? Adhesive Remover                                                  []? Barrier Ring - convex      [x]? Ostomy Powder                                                    []? Paste      []? Belt        []?  Other:        Topical Treatments to skin around stoma: Do not apply lotions, creams, or ointments to wound bed unless directed. [x]? Apply barrier film/spray to skin around stoma and let it dry  [x]? Apply stoma powder to irritated skin around stoma, dust away excess and seal in with barrier film/spray and repeat x2  []? Apply antifungal cream to irritated skin surrounding stoma and work into skin until no longer able to feel cream.  []? Apply antifungal powder to irritated skin surrounding stoma, seal in with barrier film; and repeat  []? Other: ____________________________________     Application of Pouch             LET'S PREPARE     [x]? Change your pouch every 3-4 days or when leaking. [x]? Gather supplies needed to change pouch wafer. [x]? Assemble new pouch system before removing old one. [x]? Using the measuring guide or pattern, trace size of opening onto back new pouch system. [x]? Cut out opening. [x]? Remove the control backing and set aside assembled pouch  [x]? Remove worn appliance by gently pulling away from skin. Use Adhesive Remover as directed  [x]? Look at back of the pouch before throwing away to see how it is worn. [x]? Wash skin with warm water, rinse and pat dry. [x]? Inspect  skin for redness or irritation.              LET'S APPLY       [x]? Apply  barrier  rings around stoma. May apply extra dust of stoma powder after applying ring. [x]? Apply wafer/pouch system over stoma and press down firmly starting around stoma and working outward. [x]? Remove control backing paper tape border if applicable and press to skin. []?                                        (If have a 2 -piece appliance, attach new pouch to wafer. This does not apply to you)  [x]? Close the bottom of pouch.  []? Apply barrier extenders to outside edges of pouch. (does not apply to you)  []? Apply belt if using, (does not apply to you)  [x]?  Lay/Sit quietly for 15-20 min to allow adhesives to mold to skin. []? Other: _____________________________________     Emptying Pouch  Colostomy/Ileostomy  [x]? Empty  pouch when 1/3 full of gas, stool. Clean bottom edge with damp toilet paper or bathroom wipe and close pouch. [x]? For non-drainable pouch - remove when 1/2 full and throw away. Clean wafer flange (ring) with toilet tissue or damp paper towel before reapplying new pouch        Other:  [x]? Shower or Swim Care Pat pouch dry, Use hair dryer on cool setting to dry back of pouch and border and Reapply barrier extenders or tape. [x]? Odor Control with deodorizer into bottom of pouch after each emptying  [x]? Lubricating gel to pouch to help emptying of thick stool  []? Other: See additional instructions       Contact Ostomy Care Nurse Practitioner  [x]? leaking issues and skin irritation        FOLLOW UP CARE:            Return Appointment:  ? DME/Wound Dressing Supplies Provided by: Amberly Duarte  ? (Please call them directly to reorder supplies when you start to run low on your supplies)     · ECF or Home Healthcare:      · Return Appointment: With Garfield Shook CNP  in  1  Week(s)  []? ? Wound Assessment with a nurse on:                 []? ? Orders placed during your visit:        : RUTH            Electronically signed by Adelita William RN on 3/15/2021 at 3:05 PM                     [address] Information: Should you experience any significant changes in your wound(s) or have questions about your wound care, please contact the [address] [place] [address] at [address] 8:30 am - 4:30 pm and Friday 8:30 am - 1:00 pm.  If you need help with your wound outside these hours and cannot wait until we are again available, contact your PCP or go to the hospital emergency room.      PLEASE NOTE: IF YOU ARE UNABLE TO OBTAIN WOUND SUPPLIES, CONTINUE TO USE THE SUPPLIES YOU HAVE AVAILABLE UNTIL YOU ARE ABLE TO [facility].  IT IS MOST IMPORTANT TO KEEP THE WOUND COVERED AT ALL TIMES.        Provider Signature/Date/Time:_____________________________________________     SCAR Vargas, DEION, RN, CWON-AP  [phone]         Electronically signed by SCAR Man - CNP on 3/16/2021 at 11:26 AM

## 2021-03-29 ENCOUNTER — HOSPITAL ENCOUNTER (OUTPATIENT)
Dept: WOUND CARE | Age: 66
Discharge: HOME | End: 2021-03-29
Payer: MEDICARE

## 2021-03-29 VITALS
HEART RATE: 82 BPM | TEMPERATURE: 98.1 F | BODY MASS INDEX: 24.83 KG/M2 | RESPIRATION RATE: 16 BRPM | WEIGHT: 158.51 LBS | DIASTOLIC BLOOD PRESSURE: 70 MMHG | SYSTOLIC BLOOD PRESSURE: 121 MMHG

## 2021-03-29 DIAGNOSIS — Z43.3 COLOSTOMY CARE (HCC): ICD-10-CM

## 2021-03-29 PROCEDURE — 99211 OFF/OP EST MAY X REQ PHY/QHP: CPT

## 2021-03-29 PROCEDURE — 99213 OFFICE O/P EST LOW 20 MIN: CPT | Performed by: NURSE PRACTITIONER

## 2021-03-29 ASSESSMENT — PAIN SCALES - GENERAL: PAINLEVEL_OUTOF10: 0

## 2021-03-29 NOTE — PROGRESS NOTES
TUNNELED VENOUS PORT PLACEMENT Left 01/30/2012    Smart Port    VARICOSE VEIN SURGERY Left        FAMILY HISTORY    Family History   Problem Relation Age of Onset    Heart Disease Mother     High Cholesterol Mother     High Cholesterol Father     Cancer Father         prostate    Diabetes Father        SOCIAL HISTORY    Social History     Tobacco Use    Smoking status: Never Smoker    Smokeless tobacco: Never Used   Substance Use Topics    Alcohol use: Yes     Alcohol/week: 1.0 standard drinks     Types: 1 Glasses of wine per week     Comment: socially    Drug use: No       ALLERGIES    Allergies   Allergen Reactions    Meperidine Nausea Only    Fentanyl Itching and Nausea And Vomiting       MEDICATIONS    Current Outpatient Medications on File Prior to Encounter   Medication Sig Dispense Refill    diclofenac (VOLTAREN) 75 MG EC tablet Take 1 tablet by mouth 2 times daily 60 tablet 3    citalopram (CELEXA) 40 MG tablet TAKE ONE TABLET BY MOUTH ONCE DAILY 30 tablet 3    Multiple Vitamins-Minerals (THERAPEUTIC MULTIVITAMIN-MINERALS) tablet Take 1 tablet by mouth daily       No current facility-administered medications on file prior to encounter. REVIEW OF SYSTEMS    Pertinent items are noted in HPI.     Objective    /70   Pulse 82   Temp 98.1 °F (36.7 °C) (Temporal)   Resp 16   Wt 158 lb 8.2 oz (71.9 kg)   LMP 05/20/2013   BMI 24.83 kg/m²     Wt Readings from Last 3 Encounters:   03/29/21 158 lb 8.2 oz (71.9 kg)   02/22/21 161 lb 13.1 oz (73.4 kg)   12/28/20 163 lb 9.6 oz (74.2 kg)       PHYSICAL EXAM    General Appearance: alert and oriented to person, place and time, well-developed and well-nourished, in no acute distress  Skin: warm and dry  Head: normocephalic and atraumatic  Eyes: pupils equal, round, and reactive to light  Pulmonary/Chest:  normal air movement, no respiratory distress  Cardiovascular: normal rate and regular rhythm    Ostomy Type: colostomy with soft formed stool. Pt states does not need pouch changed today. Stoma Assessment: no changes    Peristomal Skin Assessment: small peristomal hernia, no irritation per pt report. Colostomy RLQ (Active)   Number of days:        Plan of care:  Pt to order the pouches sent to her on trial and will continue to change as needed. NO new complaints and pt discharged from Ostomy care follow-up. Please see attached Discharge Instructions    Written patient dismissal instructions given to patient and signed by patient or POA. Discharge Instructions            [address] [place] [address] Drive. [address] [place], [place] [address] [phone]        Name: Leigh Strauss  : 1955   AGE: 72 y.o.  ZT  Today's Date: 3/29/2021     Ostomy skin care  Cleanse skin prior to applying a new pouch/wafer with:  [x]? ? Water             []? ? Cleanse skin with Mild Soap & Water  [x]? ? May Shower    []? ? Other:        Clinic ostomy size:  1  & 1 3/4     Pouch/Wafer/Accessory Product Numbers        [x]? ? Appliance: 1 piece [place]  convex  [x]? ? Product Numbers: # [id number] pre cut  [x]? ? Other: Accessories:        [x]? ?[address]                                                                   [x]? ? Barrier Ring - flat     [place] slim # [id number]          [x]? ?Bertrum Barnacle Remover                                                  []? ? Barrier Ring - convex      [x]? ? Ostomy Powder                                                    []? ? Paste      []? ? Belt        []? ? Other:        Topical Treatments to skin around stoma: Do not apply lotions, creams, or ointments to wound bed unless directed.     [x]? ? Apply barrier film/spray to skin around stoma and let it dry  [x]? ? Apply stoma powder to irritated skin around stoma, dust away excess and seal in with barrier film/spray and repeat x2  []? ? Apply antifungal cream to irritated skin surrounding stoma and work into skin until no longer able to feel cream.  []?? Apply antifungal powder to irritated skin surrounding stoma, seal in with barrier film; and repeat  []? ? Other: ____________________________________     Application of Pouch             LET'S PREPARE     [x]? ? Change your pouch every 3-4 days or when leaking. [x]? ? Gather supplies needed to change pouch wafer. [x]? ? Assemble new pouch system before removing old one. [x]? ? Using the measuring guide or pattern, trace size of opening onto back new pouch system. [x]? ? Cut out opening. [x]? ? Remove the control backing and set aside assembled pouch  [x]? ? Remove worn appliance by gently pulling away from skin. Use Adhesive Remover as directed  [x]? ? Look at back of the pouch before throwing away to see how it is worn. [x]? ? Wash skin with warm water, rinse and pat dry.    [x]? ? Inspect  skin for redness or irritation.              LET'S APPLY       [x]? ? Apply [place] around stoma. May apply extra dust of stoma powder after applying ring. [x]? ? Apply wafer/pouch system over stoma and press down firmly starting around stoma and working outward. [x]? ? Remove control backing paper tape border if applicable and press to skin.             []? ?                                        (If have a 2 -piece appliance, attach new pouch to wafer. This does not apply to you)  [x]? ? Close the bottom of pouch.  []??                    Apply barrier extenders to outside edges of pouch. (does not apply to you)  []? ?                       [employer] belt if using, (does not apply to you)  [x]? ? Lay/Sit quietly for 15-20 min to allow adhesives to mold to skin. []?? Other: _____________________________________     Emptying Pouch  Colostomy/Ileostomy  [x]? ? Empty  pouch when 1/3 full of gas, stool.  Clean bottom edge with damp toilet paper or bathroom wipe and close pouch. [x]? ? For non-drainable pouch - remove when 1/2 full and throw away.  Clean wafer flange (ring) with toilet tissue or damp

## 2021-05-10 ENCOUNTER — HOSPITAL ENCOUNTER (OUTPATIENT)
Dept: WOUND CARE | Age: 66
Discharge: HOME | End: 2021-05-10
Payer: MEDICARE

## 2021-05-10 VITALS
DIASTOLIC BLOOD PRESSURE: 75 MMHG | HEIGHT: 67 IN | WEIGHT: 160.5 LBS | RESPIRATION RATE: 18 BRPM | HEART RATE: 79 BPM | BODY MASS INDEX: 25.19 KG/M2 | TEMPERATURE: 96.4 F | SYSTOLIC BLOOD PRESSURE: 137 MMHG

## 2021-05-10 DIAGNOSIS — Z43.3 COLOSTOMY CARE (HCC): ICD-10-CM

## 2021-05-10 PROCEDURE — 99213 OFFICE O/P EST LOW 20 MIN: CPT | Performed by: NURSE PRACTITIONER

## 2021-05-10 PROCEDURE — 99212 OFFICE O/P EST SF 10 MIN: CPT

## 2021-05-10 ASSESSMENT — PAIN SCALES - GENERAL: PAINLEVEL_OUTOF10: 0

## 2021-05-10 NOTE — PROGRESS NOTES
[address], [place]                                                                                      Telephone: [phone]      NAME:  Margaret Darby. RECORD NUMBER:  [id number]  AGE: 72 y.o. GENDER:  female  :  1955  TODAY'S DATE:  5/10/2021    Subjective       Chief Complaint   Patient presents with    Wound Check     fOLLOW UP FOR OSTOMY VISIT. HISTORY of PRESENT ILLNESS HPI   Leigh De La Cruz is a 72 y. o. female patient referred by self, who presents today for ostomy/stoma evaluation. Pt had loop colostomy in 2013 @ [facility] and is independent in all aspects of care.  She states she has gained 30 lbs in last 4 years and noticing leaking issues. Initially when started with [facility] pt getting only 1-2 day wear time. Her original surgeon has since moved to another city and is looking to get a new surgeon to evaluate options of reanastomosis with takedown of ostomy.  Pt states her ostomy is reversible but was not recommended by Dr. Sumi Arteaga.  Dr Karol Vo is her GI doctor.  Pt was using samples of a precut pouch to try and she states is very pleased and was getting a consistent 3-4 day wear time without leaking or skin irritation. Recently noticing leaking issues from right side of stoma.    Pt works full time.       Pouching/Skin Issues: leaking and skin irritation  Current Pouching System: one-piece- convex drainable  History of Ostomy:  Loop Colostomy/Surgeon: Dr. Latoya Bhat  Wound/Ulcer Pain Timing/Severity: mild  Quality of pain: burning  Severity:  1 / 10   Modifying Factors: leaking  Associated Signs/Symptoms: erythema  PAST MEDICAL HISTORY        Diagnosis Date    Acute appendicitis 2013    Arthritis     [facility]     Rectal, lung    [facility] care [facility] [address] [facility] 2021    Depression     Malignant neoplasm of rectum ([facility] [address] 2014    Peristomal skin complication        PAST SURGICAL HISTORY    Past Surgical History:   Procedure Laterality Date    ABDOMEN SURGERY      rectal ca    APPENDECTOMY      LAPAROSCOPIC APPENDECTOMY N/A 11/19/13    attempted open appy done    MANDIBLE SURGERY      overbite repair    AL COLOSTOMY      TUNNELED VENOUS PORT PLACEMENT Left 01/30/2012    Smart Port    VARICOSE VEIN SURGERY Left        FAMILY HISTORY    Family History   Problem Relation Age of Onset    Heart Disease Mother     High Cholesterol Mother     High Cholesterol Father     Cancer Father         prostate    Diabetes Father        SOCIAL HISTORY    Social History     Tobacco Use    Smoking status: Never Smoker    Smokeless tobacco: Never Used   Substance Use Topics    Alcohol use: Yes     Alcohol/week: 1.0 standard drinks     Types: 1 Glasses of wine per week     Comment: socially    Drug use: No       ALLERGIES    Allergies   Allergen Reactions    Meperidine Nausea Only    Fentanyl Itching and Nausea And Vomiting       MEDICATIONS    Current Outpatient Medications on File Prior to Encounter   Medication Sig Dispense Refill    diclofenac (VOLTAREN) 75 MG EC tablet Take 1 tablet by mouth 2 times daily 60 tablet 3    citalopram (CELEXA) 40 MG tablet TAKE ONE TABLET BY MOUTH ONCE DAILY 30 tablet 3    Multiple Vitamins-Minerals (THERAPEUTIC MULTIVITAMIN-MINERALS) tablet Take 1 tablet by mouth daily       No current facility-administered medications on file prior to encounter. REVIEW OF SYSTEMS    Pertinent items are noted in HPI.     Objective    /75   Pulse 79   Temp 96.4 °F (35.8 °C) (Infrared)   Resp 18   Ht 5' 7\" (1.702 m)   Wt 160 lb 7.9 oz (72.8 kg)   LMP 05/20/2013   BMI 25.14 kg/m²     Wt Readings from Last 3 Encounters:   05/10/21 160 lb 7.9 oz (72.8 kg)   03/29/21 158 lb 8.2 oz (71.9 kg)   02/22/21 161 lb 13.1 oz (73.4 kg)       PHYSICAL EXAM    General Appearance: alert and oriented to person, place and time, well-developed and well-nourished, in no acute distress  Skin: warm and dry, no rash or erythema  Head: normocephalic and atraumatic  Eyes: pupils equal, round, and reactive to light  Pulmonary/Chest: c normal air movement, no respiratory distress  Cardiovascular: normal rate and regular rhythm    Ostomy Type: colostomy with soft formed stool    Stoma Assessment: red, moist with pistoning of stoma with movements. Os functions just above skin level at 8 o'clock - this is where she gets leaking issues    Peristomal Skin Assessment: slight redness and maceration of tissues @ 8 o'clock               Colostomy RLQ (Active)   Number of days:        Plan   Patient examined and evaluated and evaluated pt's technique with application of pouch. Assessment of old pouch showed good wearing on back of pouch. Pt to continue with same technique and has adequate supplies now she is getting improved wearing time. Pt to call with any other issues with pouching, skin issues or supplies. Please see attached Discharge Instructions    Written patient dismissal instructions given to patient and signed by patient or POA. Discharge Instructions         Discharge Instructions             [address] [place] [address]  [phone]        Name: Leigh Olvera  : 1955   AGE: 72 y.o.  KPV: [id number]  Today's Date: 5/10/2021     Ostomy skin care  Cleanse skin prior to applying a new pouch/wafer with:  [x]? ? Water             []? ? Cleanse skin with Mild Soap & Water  [x]? ? May Shower    []? ? Other:        Clinic ostomy size:  1 1/4 & 1 3/4     Pouch/Wafer/Accessory Product Numbers        [x]? ? Appliance: 1 piece Koko  convex  [x]? ? Product Numbers: # [id number]  [x]? ? Other: Accessories:        [x]? ?[address]                                                                   [x]? ? Barrier Ring - flat     Sunol slim # [id number]          [x]? ? Adhesive Remover                                                  []? ? Barrier Ring - convex      [x]? ? Ostomy Powder                                                    []? ? Paste      []? ? Belt        [x]? ? Other: Hytape- waterproof           Topical Treatments to skin around stoma: Do not apply lotions, creams, or ointments to wound bed unless directed.     [x]? ? Apply barrier film/spray to skin around stoma and let it dry  [x]? ? Apply stoma powder to irritated skin around stoma, dust away excess and seal in with barrier film/spray and repeat x2  []? ? Apply antifungal cream to irritated skin surrounding stoma and work into skin until no longer able to feel cream.  []?? Apply antifungal powder to irritated skin surrounding stoma, seal in with barrier film; and repeat  []? ? Other: ____________________________________     Application of Pouch             LET'S PREPARE     [x]? ? Change your pouch every 3-4 days or when leaking. [x]? ? Gather supplies needed to change pouch wafer. [x]? ? Assemble new pouch system before removing old one. [x]? ? Using the measuring guide or pattern, trace size of opening onto back new pouch system. [x]? ? Cut out opening. [x]? ? Remove the control backing and set aside assembled pouch  [x]? ? Remove worn appliance by gently pulling away from skin. Use Adhesive Remover as directed  [x]? ? Look at back of the pouch before throwing away to see how it is worn. [x]? ? Wash skin with warm water, rinse and pat dry.    [x]? ? Inspect  skin for redness or irritation.              LET'S APPLY       [x]? ? Apply barrier  rings around back of wafer. May apply extra dust of stoma powder after applying ring. [x]? ? Apply wafer/pouch system over stoma and press down firmly starting around stoma and working outward. [x]? ? Remove control backing paper tape border if applicable and press to skin.                                       [x]? ? Close the bottom of pouch. [x]? ? Apply Hy Tape to outside edges of pouch.                  [x]? ? Lay/Sit quietly for 15-20 min to allow adhesives to mold to skin. []?? Other: _____________________________________     Emptying Pouch  Colostomy/Ileostomy  [x]? ? Empty  pouch when 1/3 full of gas, stool.  Clean bottom edge with damp toilet paper or bathroom wipe and close pouch. [x]? ? For non-drainable pouch - remove when 1/2 full and throw away.  Clean wafer flange (ring) with toilet tissue or damp paper towel before reapplying new pouch        Other:  [x]? ? Shower or Swim Care Pat pouch dry, Use hair dryer on cool setting to dry back of pouch and border and Reapply barrier extenders or tape. [x]? ? Odor Control with deodorizer into bottom of pouch after each emptying  [x]? ? Lubricating gel to pouch to help emptying of thick stool  []? ? Other: See additional instructions       Contact Ostomy Care Nurse Practitioner  [x]? ? leaking issues and skin irritation        FOLLOW UP CARE:            Return Appointment:  ? DME/Wound Dressing Supplies Provided by: Lax[facility] Salcido  ? (Please call them directly to reorder supplies when you start to run low on your supplies)     · ECF or Home Healthcare:      · Return Appointment: call as needed   []??? Wound Assessment with a nurse on:                 []??? Orders placed during your visit:        * For acute needs to assist with ostomy issues, please refer to Dr. Luzma Castrejon, DNP, RN, AGNP-BC, CWOCN with The [address] [place] [address]. There is  4 different locations and phone numbers, but please call this number. Located in:  [facility]  Address: [address] [place] [address] [address], [facility]    Phone: [phone]     : RUTH          Electronically signed by Charity Alfaro RN on 5/10/2021 at 3:39 PM              [place] Information: Should you experience any significant changes in your wound(s) or have questions about your wound care, please contact the [address] [place] [address]

## 2021-05-20 ENCOUNTER — NURSE TRIAGE (OUTPATIENT)
Dept: OTHER | Facility: CLINIC | Age: 66
End: 2021-05-20

## 2021-05-20 NOTE — TELEPHONE ENCOUNTER
Received call from Siena Pepe at pre-service center [facility]) [address] with Red Flag Complaint. Brief description of triage: Right shoulder blade discomfort for the last month, see assessment below. Triage indicates for patient to be seen in the office today. Care advice provided, patient verbalizes understanding; denies any other questions or concerns; instructed to call back for any new or worsening symptoms. Writer provided warm transfer to [facility] at [facility] for appointment scheduling. Attention Provider: Thank you for allowing me to participate in the care of your patient. The patient was connected to triage in response to information provided to the [facility]. Please do not respond through this encounter as the response is not directed to a shared pool. Reason for Disposition   High-risk adult (e.g., history of cancer, history of HIV, or history of IV drug abuse)    Answer Assessment - Initial Assessment Questions  1. ONSET: \"When did the pain begin? \"      Pt states for a month    2. LOCATION: \"Where does it hurt? \" (upper, mid or lower back)      Right shoulder blade pain    3. SEVERITY: \"How bad is the pain? \"  (e.g., Scale 1-10; mild, moderate, or severe)    - MILD (1-3): doesn't interfere with normal activities     - MODERATE (4-7): interferes with normal activities or awakens from sleep     - SEVERE (8-10): excruciating pain, unable to do any normal activities       Mild 2/10    4. PATTERN: \"Is the pain constant? \" (e.g., yes, no; constant, intermittent)       With movement     5. RADIATION: \"Does the pain shoot into your legs or elsewhere? \"      Started from neck and goes into back of shoulder blade    6. CAUSE:  \"What do you think is causing the back pain? \"       Pt states she has arthritis in the neck that \"moves around\" but is unsure    7. BACK OVERUSE:  Catherin Been recent lifting of heavy objects, strenuous work or exercise? \"      Denies    8.  MEDICATIONS: \"What have you taken so far for the

## 2021-05-21 ENCOUNTER — OFFICE VISIT (OUTPATIENT)
Dept: INTERNAL MEDICINE CLINIC | Age: 66
End: 2021-05-21
Payer: MEDICARE

## 2021-05-21 VITALS
HEIGHT: 67 IN | TEMPERATURE: 97.8 F | WEIGHT: 161 LBS | HEART RATE: 75 BPM | BODY MASS INDEX: 25.27 KG/M2 | DIASTOLIC BLOOD PRESSURE: 61 MMHG | SYSTOLIC BLOOD PRESSURE: 111 MMHG | RESPIRATION RATE: 16 BRPM | OXYGEN SATURATION: 96 %

## 2021-05-21 DIAGNOSIS — M77.8 RIGHT SHOULDER TENDONITIS: ICD-10-CM

## 2021-05-21 DIAGNOSIS — M54.12 CERVICAL RADICULOPATHY: Primary | ICD-10-CM

## 2021-05-21 PROCEDURE — 3017F COLORECTAL CA SCREEN DOC REV: CPT | Performed by: INTERNAL MEDICINE

## 2021-05-21 PROCEDURE — 1123F ACP DISCUSS/DSCN MKR DOCD: CPT | Performed by: INTERNAL MEDICINE

## 2021-05-21 PROCEDURE — 1036F TOBACCO NON-USER: CPT | Performed by: INTERNAL MEDICINE

## 2021-05-21 PROCEDURE — 99213 OFFICE O/P EST LOW 20 MIN: CPT | Performed by: INTERNAL MEDICINE

## 2021-05-21 PROCEDURE — G8400 PT W/DXA NO RESULTS DOC: HCPCS | Performed by: INTERNAL MEDICINE

## 2021-05-21 PROCEDURE — 1090F PRES/ABSN URINE INCON ASSESS: CPT | Performed by: INTERNAL MEDICINE

## 2021-05-21 PROCEDURE — G8427 DOCREV CUR MEDS BY ELIG CLIN: HCPCS | Performed by: INTERNAL MEDICINE

## 2021-05-21 PROCEDURE — G8417 CALC BMI ABV UP PARAM F/U: HCPCS | Performed by: INTERNAL MEDICINE

## 2021-05-21 PROCEDURE — 4040F PNEUMOC VAC/ADMIN/RCVD: CPT | Performed by: INTERNAL MEDICINE

## 2021-05-21 ASSESSMENT — PATIENT HEALTH QUESTIONNAIRE - PHQ9
SUM OF ALL RESPONSES TO PHQ9 QUESTIONS 1 & 2: 0
1. LITTLE INTEREST OR PLEASURE IN DOING THINGS: 0
SUM OF ALL RESPONSES TO PHQ QUESTIONS 1-9: 0
SUM OF ALL RESPONSES TO PHQ QUESTIONS 1-9: 0

## 2021-05-21 ASSESSMENT — ENCOUNTER SYMPTOMS
COUGH: 0
SHORTNESS OF BREATH: 0

## 2021-05-21 NOTE — PATIENT INSTRUCTIONS
Discussed with her    Start diclofenac 2 times daily with  food   Advised neck and shoulder exercises  Patient Education        Rotator Cuff: Exercises  Introduction  Here are some examples of exercises for you to try. The exercises may be suggested for a condition or for rehabilitation. Start each exercise slowly. Ease off the exercises if you start to have pain. You will be told when to start these exercises and which ones will work best for you. How to do the exercises  Pendulum swing   If you have pain in your back, do not do this exercise. 1. Hold on to a table or the back of a chair with your good arm. Then bend forward a little and let your sore arm hang straight down. This exercise does not use the arm muscles. Rather, use your legs and your hips to create movement that makes your arm swing freely. 2. Use the movement from your hips and legs to guide the slightly swinging arm back and forth like a pendulum (or elephant trunk). Then guide it in circles that start small (about the size of a dinner plate). Make the circles a bit larger each day, as your pain allows. 3. Do this exercise for 5 minutes, 5 to 7 times each day. 4. As you have less pain, try bending over a little farther to do this exercise. This will increase the amount of movement at your shoulder. Posterior stretching exercise   1. Hold the elbow of your injured arm with your other hand. 2. Use your hand to pull your injured arm gently up and across your body. You will feel a gentle stretch across the back of your injured shoulder. 3. Hold for at least 15 to 30 seconds. Then slowly lower your arm. 4. Repeat 2 to 4 times. Up-the-back stretch   Your doctor or physical therapist may want you to wait to do this stretch until you have regained most of your range of motion and strength. You can do this stretch in different ways. Hold any of these stretches for at least 15 to 30 seconds. Repeat them 2 to 4 times.   1. Light stretch: Put your hand in your back pocket. Let it rest there to stretch your shoulder. 2. Moderate stretch: With your other hand, hold your injured arm (palm outward) behind your back by the wrist. Pull your arm up gently to stretch your shoulder. 3. Advanced stretch: Put a towel over your other shoulder. Put the hand of your injured arm behind your back. Now hold the back end of the towel. With the other hand, hold the front end of the towel in front of your body. Pull gently on the front end of the towel. This will bring your hand farther up your back to stretch your shoulder. Overhead stretch   1. Standing about an arm's length away, grasp onto a solid surface. You could use a countertop, a doorknob, or the back of a sturdy chair. 2. With your knees slightly bent, bend forward with your arms straight. Lower your upper body, and let your shoulders stretch. 3. As your shoulders are able to stretch farther, you may need to take a step or two backward. 4. Hold for at least 15 to 30 seconds. Then stand up and relax. If you had stepped back during your stretch, step forward so you can keep your hands on the solid surface. 5. Repeat 2 to 4 times. Shoulder flexion (lying down)   To make a wand for this exercise, use a piece of PVC pipe or a broom handle with the broom removed. Make the wand about a foot wider than your shoulders. 1. Lie on your back, holding a wand with both hands. Your palms should face down as you hold the wand. 2. Keeping your elbows straight, slowly raise your arms over your head. Raise them until you feel a stretch in your shoulders, upper back, and chest.  3. Hold for 15 to 30 seconds. 4. Repeat 2 to 4 times. Shoulder rotation (lying down)   To make a wand for this exercise, use a piece of PVC pipe or a broom handle with the broom removed. Make the wand about a foot wider than your shoulders. 1. Lie on your back. Hold a wand with both hands with your elbows bent and palms up.   2. Keep your elbows close to your body, and move the wand across your body toward the sore arm. 3. Hold for 8 to 12 seconds. 4. Repeat 2 to 4 times. Wall climbing (to the side)   Avoid any movement that is straight to your side, and be careful not to arch your back. Your arm should stay about 30 degrees to the front of your side. 1. Stand with your side to a wall so that your fingers can just touch it at an angle about 30 degrees toward the front of your body. 2. Walk the fingers of your injured arm up the wall as high as pain permits. Try not to shrug your shoulder up toward your ear as you move your arm up. 3. Hold that position for a count of at least 15 to 20.  4. Walk your fingers back down to the starting position. 5. Repeat at least 2 to 4 times. Try to reach higher each time. Wall climbing (to the front)   During this stretching exercise, be careful not to arch your back. 1. Face a wall, and stand so your fingers can just touch it. 2. Keeping your shoulder down, walk the fingers of your injured arm up the wall as high as pain permits. (Don't shrug your shoulder up toward your ear.)  3. Hold your arm in that position for at least 15 to 30 seconds. 4. Slowly walk your fingers back down to where you started. 5. Repeat at least 2 to 4 times. Try to reach higher each time. Shoulder blade squeeze   1. Stand with your arms at your sides, and squeeze your shoulder blades together. Do not raise your shoulders up as you squeeze. 2. Hold 6 seconds. 3. Repeat 8 to 12 times. Scapular exercise: Arm reach   1. Lie flat on your back. This exercise is a very slight motion that starts with your arms raised (elbows straight, arms straight). 2. From this position, reach higher toward the hugh or ceiling. Keep your elbows straight. All motion should be from your shoulder blade only. 3. Relax your arms back to where you started. 4. Repeat 8 to 12 times.     Arm raise to the side   During this strengthening exercise, your arm should stay about 30 degrees to the front of your side. 1. Slowly raise your injured arm to the side, with your thumb facing up. Raise your arm 60 degrees at the most (shoulder level is 90 degrees). 2. Hold the position for 3 to 5 seconds. Then lower your arm back to your side. If you need to, bring your \"good\" arm across your body and place it under the elbow as you lower your injured arm. Use your good arm to keep your injured arm from dropping down too fast.  3. Repeat 8 to 12 times. 4. When you first start out, don't hold any extra weight in your hand. As you get stronger, you may use a 1-pound to 2-pound dumbbell or a small can of food. Shoulder flexor and extensor exercise   These are isometric exercises. That means you contract your muscles without actually moving. 1. Push forward (flex): Stand facing a wall or doorjamb, about 6 inches or less back. Hold your injured arm against your body. Make a closed fist with your thumb on top. Then gently push your hand forward into the wall with about 25% to 50% of your strength. Don't let your body move backward as you push. Hold for about 6 seconds. Relax for a few seconds. Repeat 8 to 12 times. 2. Push backward (extend): Stand with your back flat against a wall. Your upper arm should be against the wall, with your elbow bent 90 degrees (your hand straight ahead). Push your elbow gently back against the wall with about 25% to 50% of your strength. Don't let your body move forward as you push. Hold for about 6 seconds. Relax for a few seconds. Repeat 8 to 12 times. Scapular exercise: Wall push-ups   This exercise is best done with your fingers somewhat turned out, rather than straight up and down. 1. Stand facing a wall, about 12 inches to 18 inches away. 2. Place your hands on the wall at shoulder height. 3. Slowly bend your elbows and bring your face to the wall. Keep your back and hips straight. 4. Push back to where you started.   5. Repeat 8 to 12 times. 6. When you can do this exercise against a wall comfortably, you can try it against a counter. You can then slowly progress to the end of a couch, then to a sturdy chair, and finally to the floor. Scapular exercise: Retraction   For this exercise, you will need elastic exercise material, such as surgical tubing or Thera-Band. 1. Put the band around a solid object at about waist level. (A bedpost will work well.) Each hand should hold an end of the band. 2. With your elbows at your sides and bent to 90 degrees, pull the band back. Your shoulder blades should move toward each other. Then move your arms back where you started. 3. Repeat 8 to 12 times. 4. If you have good range of motion in your shoulders, try this exercise with your arms lifted out to the sides. Keep your elbows at a 90-degree angle. Raise the elastic band up to about shoulder level. Pull the band back to move your shoulder blades toward each other. Then move your arms back where you started. Internal rotator strengthening exercise   1. Start by tying a piece of elastic exercise material to a doorknob. You can use surgical tubing or Thera-Band. 2. Stand or sit with your shoulder relaxed and your elbow bent 90 degrees. Your upper arm should rest comfortably against your side. Squeeze a rolled towel between your elbow and your body for comfort. This will help keep your arm at your side. 3. Hold one end of the elastic band in the hand of the painful arm. 4. Slowly rotate your forearm toward your body until it touches your belly. Slowly move it back to where you started. 5. Keep your elbow and upper arm firmly tucked against the towel roll or at your side. 6. Repeat 8 to 12 times. External rotator strengthening exercise   1. Start by tying a piece of elastic exercise material to a doorknob. You can use surgical tubing or Thera-Band. (You may also hold one end of the band in each hand.)  2.  Stand or sit with your shoulder relaxed and your elbow bent 90 degrees. Your upper arm should rest comfortably against your side. Squeeze a rolled towel between your elbow and your body for comfort. This will help keep your arm at your side. 3. Hold one end of the elastic band with the hand of the painful arm. 4. Start with your forearm across your belly. Slowly rotate the forearm out away from your body. Keep your elbow and upper arm tucked against the towel roll or the side of your body until you begin to feel tightness in your shoulder. Slowly move your arm back to where you started. 5. Repeat 8 to 12 times. Follow-up care is a key part of your treatment and safety. Be sure to make and go to all appointments, and call your doctor if you are having problems. It's also a good idea to know your test results and keep a list of the medicines you take. Where can you learn more? Go to https://[employer]. org and sign in to your [employer] account. Enter Waylan Eisenmenger in the [facility] box to learn more about \"Rotator Cuff: Exercises. \"     If you do not have an account, please click on the \"Sign Up Now\" link. Current as of: November 16, 2020               Content Version: 12.8  © 2006-2021 [employer]. Care instructions adapted under license by [facility] ([facility]). If you have questions about a medical condition or this instruction, always ask your healthcare professional. [place] [address] any warranty or liability for your use of this information.

## 2021-05-21 NOTE — PROGRESS NOTES
Subjective:      Patient ID: Adolfo Garza is a 72 y.o. female. Chief Complaint   Patient presents with    Other     right shoulder pain        HPI  Having  pain along rigth shoulder blade for 2 weeks and at times goes to rigth shoulder also and ha sno tingling or numbness  Also has pain rigth shoulder and up to mid arm and increases with movement  Has no h/o injury  Not taking diclofenac regularly  Is worried her cancer may be coming back  Review of Systems   Constitutional: Negative for activity change, appetite change and unexpected weight change. Respiratory: Negative for cough and shortness of breath. Cardiovascular: Negative for chest pain. Current Outpatient Medications   Medication Sig Dispense Refill    diclofenac (VOLTAREN) 75 MG EC tablet Take 1 tablet by mouth 2 times daily 60 tablet 3    citalopram (CELEXA) 40 MG tablet TAKE ONE TABLET BY MOUTH ONCE DAILY 30 tablet 3    Multiple Vitamins-Minerals (THERAPEUTIC MULTIVITAMIN-MINERALS) tablet Take 1 tablet by mouth daily       No current facility-administered medications for this visit. No results for input(s): WBC, HGB, HCT, MCV, PLT in the last 720 hours. Lab Results   Component Value Date     04/14/2017    K 4.0 04/14/2017     04/14/2017    CO2 25 04/14/2017    BUN 17 04/14/2017    CREATININE 0.8 03/13/2021    CREATININE 0.7 04/14/2017    GLUCOSE 95 04/14/2017    CALCIUM 9.0 04/14/2017        No results found for: CHOL  No results found for: TRIG  No results found for: HDL  No results found for: LDLCHOLESTEROL, LDLCALC  No results found for: LABVLDL, VLDL  No results found for: CHOLHDLRATIO     Objective:   Physical Exam  Vitals and nursing note reviewed. Cardiovascular:      Rate and Rhythm: Normal rate and regular rhythm. Heart sounds: Normal heart sounds. No murmur heard. No gallop. Pulmonary:      Effort: No respiratory distress. Breath sounds: Normal breath sounds.  No wheezing, rhonchi or keya.   Musculoskeletal:      Comments: Has slight right upper thoracic paraspinal tenderness and trapezius tenderness   Has decreased rom with lateral bends of c-spine and not with flexion and extension  Has reproducible pain to rigth shoulder blade when moving head to left lateral bend    Has full rom rigth shoulder flexion and extension and decreased rom with internal rotation and has no weakness of her muscles   Lymphadenopathy:      Cervical: No cervical adenopathy. Neurological:      Mental Status: She is alert and oriented to person, place, and time. 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   Plan:      Discussed with her    Start diclofenac 2 times daily with  food   Advised neck and shoulder exercises    Sherre Meigs, MD

## 2021-07-26 RX ORDER — DICLOFENAC SODIUM 75 MG/1
TABLET, DELAYED RELEASE ORAL
Qty: 60 TABLET | Refills: 0 | Status: SHIPPED | OUTPATIENT
Start: 2021-07-26

## 2021-10-07 ENCOUNTER — HOSPITAL ENCOUNTER (OUTPATIENT)
Dept: CT IMAGING | Age: 66
Discharge: HOME | End: 2021-10-07
Payer: MEDICARE

## 2021-10-07 DIAGNOSIS — C20 MALIGNANT NEOPLASM OF RECTUM (HCC): ICD-10-CM

## 2021-10-07 LAB
GFR AFRICAN AMERICAN: >60
GFR NON-AFRICAN AMERICAN: >60
PERFORMED ON: ABNORMAL
POC CREATININE: 0.5 MG/DL (ref 0.6–1.2)
POC SAMPLE TYPE: ABNORMAL

## 2021-10-07 PROCEDURE — 74177 CT ABD & PELVIS W/CONTRAST: CPT

## 2021-10-07 PROCEDURE — 6360000004 HC RX CONTRAST MEDICATION: Performed by: INTERNAL MEDICINE

## 2021-10-07 PROCEDURE — 82565 ASSAY OF CREATININE: CPT

## 2021-10-07 RX ADMIN — IOPAMIDOL 75 ML: 755 INJECTION, SOLUTION INTRAVENOUS at 12:56

## 2021-10-07 RX ADMIN — IOHEXOL 50 ML: 240 INJECTION, SOLUTION INTRATHECAL; INTRAVASCULAR; INTRAVENOUS; ORAL at 12:56

## 2021-12-21 PROBLEM — C20 RECTAL CANCER (HCC): Status: RESOLVED | Noted: 2017-02-05 | Resolved: 2021-12-21

## 2022-05-09 ENCOUNTER — HOSPITAL ENCOUNTER (OUTPATIENT)
Dept: CT IMAGING | Age: 67
Discharge: HOME | End: 2022-05-09
Payer: MEDICARE

## 2022-05-09 DIAGNOSIS — C20 MALIGNANT NEOPLASM OF RECTUM (HCC): ICD-10-CM

## 2022-05-09 LAB
GFR AFRICAN AMERICAN: >60
GFR NON-AFRICAN AMERICAN: >60
PERFORMED ON: NORMAL
POC CREATININE: 0.9 MG/DL (ref 0.6–1.2)
POC SAMPLE TYPE: NORMAL

## 2022-05-09 PROCEDURE — 74177 CT ABD & PELVIS W/CONTRAST: CPT

## 2022-05-09 PROCEDURE — 6360000004 HC RX CONTRAST MEDICATION: Performed by: INTERNAL MEDICINE

## 2022-05-09 PROCEDURE — 82565 ASSAY OF CREATININE: CPT

## 2022-05-09 RX ADMIN — IOPAMIDOL 75 ML: 755 INJECTION, SOLUTION INTRAVENOUS at 09:20

## 2022-05-09 RX ADMIN — IOHEXOL 50 ML: 240 INJECTION, SOLUTION INTRATHECAL; INTRAVASCULAR; INTRAVENOUS; ORAL at 09:20

## 2022-07-24 ENCOUNTER — HOSPITAL ENCOUNTER (EMERGENCY)
Age: 67
Discharge: HOME | End: 2022-07-24
Attending: EMERGENCY MEDICINE
Payer: MEDICARE

## 2022-07-24 ENCOUNTER — APPOINTMENT (OUTPATIENT)
Dept: GENERAL RADIOLOGY | Age: 67
End: 2022-07-24
Payer: MEDICARE

## 2022-07-24 VITALS
HEIGHT: 67 IN | RESPIRATION RATE: 16 BRPM | DIASTOLIC BLOOD PRESSURE: 67 MMHG | HEART RATE: 81 BPM | BODY MASS INDEX: 25.22 KG/M2 | SYSTOLIC BLOOD PRESSURE: 113 MMHG | OXYGEN SATURATION: 98 % | TEMPERATURE: 98.1 F

## 2022-07-24 DIAGNOSIS — M54.42 ACUTE LEFT-SIDED LOW BACK PAIN WITH LEFT-SIDED SCIATICA: Primary | ICD-10-CM

## 2022-07-24 PROCEDURE — 72100 X-RAY EXAM L-S SPINE 2/3 VWS: CPT

## 2022-07-24 PROCEDURE — 99283 EMERGENCY DEPT VISIT LOW MDM: CPT

## 2022-07-24 PROCEDURE — 6370000000 HC RX 637 (ALT 250 FOR IP): Performed by: EMERGENCY MEDICINE

## 2022-07-24 PROCEDURE — 73502 X-RAY EXAM HIP UNI 2-3 VIEWS: CPT

## 2022-07-24 RX ORDER — METHYLPREDNISOLONE 4 MG/1
TABLET ORAL
Qty: 1 KIT | Refills: 0 | Status: SHIPPED | OUTPATIENT
Start: 2022-07-24

## 2022-07-24 RX ORDER — METHOCARBAMOL 500 MG/1
500 TABLET, FILM COATED ORAL 4 TIMES DAILY
Qty: 40 TABLET | Refills: 0 | Status: SHIPPED | OUTPATIENT
Start: 2022-07-24 | End: 2022-08-03

## 2022-07-24 RX ORDER — HYDROCODONE BITARTRATE AND ACETAMINOPHEN 5; 325 MG/1; MG/1
1 TABLET ORAL ONCE
Status: COMPLETED | OUTPATIENT
Start: 2022-07-24 | End: 2022-07-24

## 2022-07-24 RX ORDER — LIDOCAINE 50 MG/G
1 PATCH TOPICAL DAILY
Qty: 10 PATCH | Refills: 0 | Status: SHIPPED | OUTPATIENT
Start: 2022-07-24 | End: 2022-08-03

## 2022-07-24 RX ORDER — METHOCARBAMOL 500 MG/1
750 TABLET, FILM COATED ORAL ONCE
Status: COMPLETED | OUTPATIENT
Start: 2022-07-24 | End: 2022-07-24

## 2022-07-24 RX ORDER — PREDNISONE 20 MG/1
60 TABLET ORAL ONCE
Status: COMPLETED | OUTPATIENT
Start: 2022-07-24 | End: 2022-07-24

## 2022-07-24 RX ADMIN — HYDROCODONE BITARTRATE AND ACETAMINOPHEN 1 TABLET: 5; 325 TABLET ORAL at 10:28

## 2022-07-24 RX ADMIN — PREDNISONE 60 MG: 20 TABLET ORAL at 10:27

## 2022-07-24 RX ADMIN — METHOCARBAMOL 750 MG: 500 TABLET ORAL at 10:28

## 2022-07-24 ASSESSMENT — PAIN DESCRIPTION - DESCRIPTORS: DESCRIPTORS: ACHING

## 2022-07-24 ASSESSMENT — PAIN SCALES - GENERAL
PAINLEVEL_OUTOF10: 2
PAINLEVEL_OUTOF10: 7
PAINLEVEL_OUTOF10: 7

## 2022-07-24 ASSESSMENT — PAIN - FUNCTIONAL ASSESSMENT
PAIN_FUNCTIONAL_ASSESSMENT: 0-10
PAIN_FUNCTIONAL_ASSESSMENT: 0-10

## 2022-07-24 ASSESSMENT — PAIN DESCRIPTION - ORIENTATION: ORIENTATION: LEFT;LOWER

## 2022-07-24 ASSESSMENT — PAIN DESCRIPTION - LOCATION: LOCATION: BACK

## 2022-07-24 NOTE — ED NOTES
Patient verbalized understanding of discharge instructions and follow-up care. Patient was e-scribed 3 prescriptions and verbalized understanding of instructions for said prescriptions. Breathing regular, no distress, skin color, texture, turgor normal. No rashes or lesions, patient alert and oriented X3. Patient ambulated out of emergency department with steady gait to private vehicle with family.      Sayda Benson RN  07/24/22 6535

## 2022-07-24 NOTE — ED PROVIDER NOTES
[facility]      Pt Name: Enid James  MRN: [id number]  [place] 1955  Date of evaluation: 7/24/2022  Provider: Trisha Regan, [address] [place] [address]       Chief Complaint   Patient presents with    Back Pain     Left sided         HISTORY OF PRESENT ILLNESS   (Location/Symptom, Timing/Onset, Context/Setting, Quality, Duration, Modifying Factors, Severity)  Note limiting factors. Endi James is a 77 y.o. female who presents to the emergency department plaint of left-sided low back pain located in the left lower lumbar area that radiates into the posterior left hip and down the lateral aspect of her left leg to the level of the knee. Pain initially began 3 days ago and has gradually worsened. She was doing some yard work yesterday evening which seems to worsened her pain. She states that as long as she is very still she does not have any pain. This morning she walked to the bathroom and was unable to get off of the toilet due to severe pain when attempting to stand up. She ended up calling 911 and the paramedics came and assisted her but she declined transport. She does not take any anticoagulants. She denies any recent fall or direct trauma to the spine. She denies any saddle anesthesia. She does not take any anticoagulants. She denies any prior back or spine surgeries. She does report that she had an episode of sciatica several years ago which resolved. She does do a lot of lifting of flowerpots when she bynum her flowers in the evening. No recent falls. She denies any weakness, numbness, incontinence, bowel or bladder difficulties. She last urinated prior to arrival.    She does have a history of rectal cancer diagnosed in 2011. She underwent chemotherapy and radiation followed by colectomy and colostomy. She still has a colostomy. No active treatment. She states that she is in remission.   At the time of her original diagnosis she did have some metastatic disease to the lung but denies any metastatic disease to the bone. Nursing Notes were reviewed. HPI        REVIEW OF SYSTEMS    (2-9 systems for level 4, 10 or more for level 5)       Constitutional: Negative for fever or chills. HENT: Negative for rhinorrhea and sore throat. Eyes: Negative for redness or drainage. Respiratory: Negative for shortness of breath or dyspnea on exertion. Cardiovascular: Negative for chest pain. Gastrointestinal: Negative for abdominal pain. Negative for vomiting or diarrhea. Genitourinary: Negative for flank pain. Negative for dysuria. Negative for hematuria. Neurological: Negative for headache. Musculoskeletal:  Negative edema. All systems are reviewed and are negative except for those listed above in the history of present illness and ROS.         PAST MEDICAL HISTORY     Past Medical History:   Diagnosis Date    Acute appendicitis 11/19/2013    Arthritis     Cancer ([facility] [address]     Rectal, lung    Colostomy care ([facility] [address] 2/23/2021    Depression     Malignant neoplasm of rectum ([facility] [address] 12/29/2014    Malignant neoplasm of rectum ([facility]) 12/29/2014    Peristomal skin complication 6/70/4606         SURGICAL HISTORY       Past Surgical History:   Procedure Laterality Date    ABDOMEN SURGERY      rectal ca    APPENDECTOMY      LAPAROSCOPIC APPENDECTOMY N/A 11/19/13    attempted open appy done    MANDIBLE SURGERY      overbite repair    [place] COLOSTOMY      TUNNELED VENOUS PORT PLACEMENT Left 01/30/2012    Smart Port    VARICOSE VEIN SURGERY Left          CURRENT MEDICATIONS       Previous Medications    CITALOPRAM (CELEXA) 40 MG TABLET    TAKE ONE TABLET BY MOUTH ONCE DAILY    DICLOFENAC (VOLTAREN) 75 MG EC TABLET    Take 1 tablet by mouth twice daily    MULTIPLE VITAMINS-MINERALS (THERAPEUTIC MULTIVITAMIN-MINERALS) TABLET    Take 1 tablet by mouth daily       ALLERGIES     Meperidine and Fentanyl    FAMILY HISTORY       Family History   Problem Relation Age of Onset    Heart Disease Mother     High Cholesterol Mother     High Cholesterol Father     Cancer Father         prostate    Diabetes Father           SOCIAL HISTORY       Social History     Socioeconomic History    Marital status:      Spouse name: None    Number of children: None    Years of education: None    Highest education level: None   Tobacco Use    Smoking status: Never    Smokeless tobacco: Never   Vaping Use    Vaping Use: Never used   Substance and Sexual Activity    Alcohol use: Yes     Alcohol/week: 1.0 standard drink     Types: 1 Glasses of wine per week     Comment: socially    Drug use: No    Sexual activity: Not Currently       SCREENINGS    Cortez Coma Scale  Eye Opening: Spontaneous  Best Verbal Response: Oriented  Best Motor Response: Obeys commands  Lindsay Coma Scale Score: 15        PHYSICAL EXAM    (up to 7 for level 4, 8 or more for level 5)     ED Triage Vitals [07/24/22 0937]   BP Temp Temp Source Heart Rate Resp SpO2 Height Weight   113/67 98.1 °F (36.7 °C) Oral 81 16 98 % 5' 7\" (1.702 m) --         Physical Exam   Constitutional: Awake and alert. Very pleasant. Appears comfortable. Sitting in a wheelchair. Head: No visible evidence of trauma. Normocephalic. Eyes: Pupils equal and reactive. No photophobia. Conjunctiva normal.    HENT: Oral mucosa moist.  Airway patent. Pharynx without erythema. Nares were clear. Neck:  Soft and supple. Nontender. Heart:  Regular rate and rhythm. No murmur. Lungs:  Clear to auscultation. No wheezes, rales, or ronchi. No conversational dyspnea or accessory muscle use. Chest: Chest wall non-tender. No evidence of trauma. Abdomen:  Soft, nondistended, bowel sounds present. Nontender. No guarding rigidity or rebound. No masses. Musculoskeletal: Thoracic and lumbar spine were nontender. No point tenderness or step-off.   Paravertebral muscle tenderness noted in the left lower lumbar spine extending into the left superior buttock area. Left hip was nontender. Pain noted with any attempted range of motion in the lumbar spine. Pelvis stable and nontender. Otherwise, all extremities non-tender with full range of motion. Radial and dorsalis pedis pulses were intact. No calf tenderness erythema or edema. Neurological: Alert and oriented x 3. Speech clear. Cranial nerves II-XII intact. No facial droop. No acute focal motor or sensory deficits. Dysarthria. No aphasia. Great toe extensor strength was 5/5 bilaterally. The patient was able to flex and extend the hip and knee bilaterally against resistance with strength 5/5. Deep tendon reflexes were 2/4 in the patellar and Achilles tendons bilaterally. No saddle anesthesia. No rash. Negative straight leg raising. No radicular pain. Gait was steady. Skin: Skin is warm and dry. No rash. Lymphatic:  No lympadenopathy. Psychiatric: Normal mood and affect. Behavior is normal.         DIAGNOSTIC RESULTS     EKG: All EKG's are interpreted by the Emergency Department Physician who either signs or Co-signs this chart in the absence of a cardiologist.        RADIOLOGY:   Non-plain film images such as CT, Ultrasound and MRI are read by the radiologist. Plain radiographic images are visualized and preliminarily interpreted by the emergency physician with the below findings:        Interpretation per the Radiologist below, if available at the time of this note:    XR LUMBAR SPINE (2-3 VIEWS)   Preliminary Result   1. No acute abnormality of the lumbar spine. 2. Minimal grade 1 anterolisthesis of L3-4 and L4-5. XR HIP 2-3 VW W PELVIS LEFT   Final Result   No acute abnormality of the hip. ED BEDSIDE ULTRASOUND:   Performed by ED Physician - none    LABS:  Labs Reviewed - No data to display    All other labs were within normal range or not returned as of this dictation.     EMERGENCY DEPARTMENT COURSE and DIFFERENTIAL DIAGNOSIS/MDM:   Vitals:    Vitals:    07/24/22 0937   BP: 113/67   Pulse: 81   Resp: 16   Temp: 98.1 °F (36.7 °C)   TempSrc: Oral   SpO2: 98%   Height: 5' 7\" (1.702 m)         MDM        Patient presents to the emergency department with a complaint of low back pain as noted above. There is tenderness in the left lower lumbar area with paravertebral muscle tenderness and spasm. Pain is worsened with range of motion. However, the patient is neurologically intact. There is no evidence of cauda equina syndrome. She is able to ambulate but has discomfort with movement. Given her history of malignancy with rectal cancer, lumbar spine and left hip x-rays with pelvis were obtained. She was medicated with Norco 5 mg p.o., Robaxin 750 mg p.o., and prednisone 60 mg p.o. REASSESSMENT          7:20 AM: X-rays of the lumbar spine and left hip and pelvis revealed no acute findings. Degenerative changes were noted. She is stable for discharge. She will be given a prescription for Medrol Dosepak, Robaxin, and lidocaine patches. Advised her to continue the Voltaren while taking Medrol Dosepak. Follow-up with a primary care physician in 1 to 2 days for reexamination. If condition worsens or new symptoms develop, return immediately to the emergency department. Use ice to the affected areas. Avoid heat or heating pads. No lifting greater than 5 pounds. Avoid strenuous lifting or heavy physical activity for 1 week. Do not drive or operate machinery while taking pain medication or muscle relaxants. May cause drowsiness. CRITICAL CARE TIME   Total Critical Care time was 0 minutes, excluding separately reportable procedures. There was a high probability of clinically significant/life threatening deterioration in the patient's condition which required my urgent intervention. CONSULTS:  None    PROCEDURES:  Unless otherwise noted below, none     Procedures        FINAL IMPRESSION      1.  Acute left-sided low back pain with left-sided sciatica          DISPOSITION/PLAN   DISPOSITION Decision To Discharge 07/24/2022 11:21:52 AM      PATIENT REFERRED TO:  Arvin Deras MD  [facility] [address] [place] [id number] [phone]    Call today      DISCHARGE MEDICATIONS:  New Prescriptions    LIDOCAINE (LIDODERM) 5 %    Place 1 patch onto the skin in the morning for 10 days. 12 hours on, 12 hours off. .    METHOCARBAMOL (ROBAXIN) 500 MG TABLET    Take 1 tablet by mouth in the morning and 1 tablet at noon and 1 tablet in the evening and 1 tablet before bedtime. Do all this for 10 days. METHYLPREDNISOLONE (MEDROL, SONIA,) 4 MG TABLET    Take by mouth. Controlled Substances Monitoring:     No flowsheet data found. (Please note that portions of this note were completed with a voice recognition program.  Efforts were made to edit the dictations but occasionally words are mis-transcribed. )    [address] Wyatt Regan DO (electronically signed)  Attending Emergency Physician           Jose Garcia DO  07/24/22 1122

## 2022-11-28 ENCOUNTER — HOSPITAL ENCOUNTER (OUTPATIENT)
Dept: CT IMAGING | Age: 67
Discharge: HOME | End: 2022-11-28
Payer: MEDICARE

## 2022-11-28 DIAGNOSIS — C78.00 MALIGNANT NEOPLASM METASTATIC TO LUNG, UNSPECIFIED LATERALITY (HCC): ICD-10-CM

## 2022-11-28 DIAGNOSIS — C20 MALIGNANT NEOPLASM OF RECTUM (HCC): ICD-10-CM

## 2022-11-28 LAB
GFR SERPL CREATININE-BSD FRML MDRD: >60 ML/MIN/{1.73_M2}
PERFORMED ON: NORMAL
POC CREATININE: 0.8 MG/DL (ref 0.6–1.2)
POC SAMPLE TYPE: NORMAL

## 2022-11-28 PROCEDURE — 71260 CT THORAX DX C+: CPT

## 2022-11-28 PROCEDURE — 6360000004 HC RX CONTRAST MEDICATION: Performed by: INTERNAL MEDICINE

## 2022-11-28 PROCEDURE — 82565 ASSAY OF CREATININE: CPT

## 2022-11-28 RX ADMIN — IOPAMIDOL 50 ML: 612 INJECTION, SOLUTION INTRAVENOUS at 08:03

## 2022-11-28 RX ADMIN — IOPAMIDOL 75 ML: 755 INJECTION, SOLUTION INTRAVENOUS at 09:16

## 2023-03-24 ENCOUNTER — TELEPHONE (OUTPATIENT)
Dept: INTERNAL MEDICINE CLINIC | Age: 68
End: 2023-03-24

## 2023-03-24 RX ORDER — DICLOFENAC SODIUM 75 MG/1
75 TABLET, DELAYED RELEASE ORAL 2 TIMES DAILY
Qty: 60 TABLET | Refills: 0 | Status: SHIPPED | OUTPATIENT
Start: 2023-03-24

## 2023-03-24 NOTE — TELEPHONE ENCOUNTER
Kenia Armijo called today to get in to see Dr Ezequiel Miller but his 1st NP appt isnt until next Friday. Dr Martín Miner was treating her for arthritis and it has returned. In a lot of pain. Both of her parents are patients of his. She wanted to know if she could have you call in something for it that Dr Martín Miner used to give her. She scheduled the appt for 3/31 @ 10:15.      Pls let her know

## 2023-05-03 ENCOUNTER — TELEPHONE (OUTPATIENT)
Dept: INTERNAL MEDICINE CLINIC | Age: 68
End: 2023-05-03

## 2023-05-03 DIAGNOSIS — Z12.31 SCREENING MAMMOGRAM FOR BREAST CANCER: Primary | ICD-10-CM

## 2023-05-31 ENCOUNTER — HOSPITAL ENCOUNTER (OUTPATIENT)
Dept: CT IMAGING | Age: 68
Discharge: HOME | End: 2023-05-31
Payer: MEDICARE

## 2023-05-31 DIAGNOSIS — C20 MALIGNANT NEOPLASM OF RECTUM (HCC): ICD-10-CM

## 2023-05-31 LAB
PERFORMED ON: NORMAL
POC CREATININE: 0.8 MG/DL (ref 0.6–1.2)
POC SAMPLE TYPE: NORMAL

## 2023-05-31 PROCEDURE — 82565 ASSAY OF CREATININE: CPT

## 2023-05-31 PROCEDURE — 71260 CT THORAX DX C+: CPT

## 2023-05-31 PROCEDURE — 6360000004 HC RX CONTRAST MEDICATION: Performed by: INTERNAL MEDICINE

## 2023-05-31 RX ADMIN — IOPAMIDOL 50 ML: 612 INJECTION, SOLUTION INTRAVENOUS at 07:46

## 2023-05-31 RX ADMIN — IOPAMIDOL 75 ML: 755 INJECTION, SOLUTION INTRAVENOUS at 07:46

## 2023-07-13 ENCOUNTER — TELEPHONE (OUTPATIENT)
Dept: INTERNAL MEDICINE CLINIC | Age: 68
End: 2023-07-13

## 2023-08-04 SDOH — ECONOMIC STABILITY: FOOD INSECURITY: WITHIN THE PAST 12 MONTHS, YOU WORRIED THAT YOUR FOOD WOULD RUN OUT BEFORE YOU GOT MONEY TO BUY MORE.: NEVER TRUE

## 2023-08-04 SDOH — ECONOMIC STABILITY: FOOD INSECURITY: WITHIN THE PAST 12 MONTHS, THE FOOD YOU BOUGHT JUST DIDN'T LAST AND YOU DIDN'T HAVE MONEY TO GET MORE.: NEVER TRUE

## 2023-08-04 SDOH — ECONOMIC STABILITY: HOUSING INSECURITY
IN THE LAST 12 MONTHS, WAS THERE A TIME WHEN YOU DID NOT HAVE A STEADY PLACE TO SLEEP OR SLEPT IN A SHELTER (INCLUDING NOW)?: NO

## 2023-08-04 SDOH — ECONOMIC STABILITY: INCOME INSECURITY: HOW HARD IS IT FOR YOU TO PAY FOR THE VERY BASICS LIKE FOOD, HOUSING, MEDICAL CARE, AND HEATING?: NOT VERY HARD

## 2023-08-07 ENCOUNTER — OFFICE VISIT (OUTPATIENT)
Dept: INTERNAL MEDICINE CLINIC | Age: 68
End: 2023-08-07

## 2023-08-07 VITALS
SYSTOLIC BLOOD PRESSURE: 120 MMHG | DIASTOLIC BLOOD PRESSURE: 75 MMHG | OXYGEN SATURATION: 98 % | HEART RATE: 73 BPM | WEIGHT: 159.2 LBS | BODY MASS INDEX: 24.93 KG/M2 | TEMPERATURE: 98.7 F

## 2023-08-07 DIAGNOSIS — M54.12 CERVICAL RADICULOPATHY: ICD-10-CM

## 2023-08-07 PROBLEM — Z43.3 COLOSTOMY CARE (HCC): Status: RESOLVED | Noted: 2021-02-23 | Resolved: 2023-08-07

## 2023-08-07 RX ORDER — PAROXETINE HYDROCHLORIDE 20 MG/1
20 TABLET, FILM COATED ORAL DAILY
Qty: 90 TABLET | Refills: 3 | Status: SHIPPED | OUTPATIENT
Start: 2023-08-07

## 2023-08-07 RX ORDER — DICLOFENAC SODIUM 75 MG/1
75 TABLET, DELAYED RELEASE ORAL 2 TIMES DAILY PRN
Qty: 60 TABLET | Refills: 1 | Status: SHIPPED | OUTPATIENT
Start: 2023-08-07

## 2023-08-07 ASSESSMENT — ENCOUNTER SYMPTOMS
CONSTIPATION: 0
VOMITING: 0
BACK PAIN: 0
SORE THROAT: 0
CHEST TIGHTNESS: 0
ABDOMINAL PAIN: 0
COUGH: 0
DIARRHEA: 0
NAUSEA: 0

## 2023-08-07 NOTE — PROGRESS NOTES
[facility]) Internal Medicine    Samara Rodriguez is a 79 y.o. female with the past medical history as listed below presenting to the clinic for follow up on chronic condition and discussion of preventative health care    Headache: Patient reports headache and neck pain. She has had neck pain for the past 2 to 3 weeks. She is using diclofenac with some relief. Problem with vision: Patient is following with eye doctor later this week. Patient is following with [facility]. Review of Systems   Constitutional:  Negative for chills, fatigue and fever. HENT:  Negative for congestion, ear pain and sore throat. Vision issues: Patient cannot see out of her new eye glasses. Respiratory:  Negative for cough and chest tightness. Cardiovascular:  Negative for chest pain, palpitations and leg swelling. Gastrointestinal:  Negative for abdominal pain, constipation, diarrhea, nausea and vomiting. Genitourinary:  Negative for dysuria, flank pain and urgency. Musculoskeletal:  Negative for arthralgias, back pain and joint swelling. Skin:  Negative for rash. Neurological:  Negative for dizziness, weakness and numbness. Psychiatric/Behavioral:  Negative for sleep disturbance. The patient is not nervous/anxious.       Past Medical History:   Diagnosis Date    Acute appendicitis 11/19/2013    Arthritis     Cancer ([address])     Rectal, lung    Colostomy care ([address]) 2/23/2021    Depression     Malignant neoplasm of rectum ([address]) 12/29/2014    Malignant neoplasm of rectum ([address]) 12/29/2014    Peristomal skin complication 1/30/4872       Past Surgical History:   Procedure Laterality Date    ABDOMEN SURGERY      rectal ca    APPENDECTOMY      LAPAROSCOPIC APPENDECTOMY N/A 11/19/13    attempted open appy done    MANDIBLE SURGERY      overbite repair    [place] COLOSTOMY/SKIN LEVEL CECOSTOMY      TUNNELED VENOUS PORT PLACEMENT Left 01/30/2012    Smart Port    VARICOSE VEIN SURGERY Left        Social History     Socioeconomic

## 2023-09-20 ENCOUNTER — COMMUNITY OUTREACH (OUTPATIENT)
Dept: INTERNAL MEDICINE CLINIC | Age: 68
End: 2023-09-20

## 2023-09-20 NOTE — PROGRESS NOTES
Patient's HM shows they are overdue for [address] and  files searched without success.

## 2023-11-27 ENCOUNTER — HOSPITAL ENCOUNTER (OUTPATIENT)
Dept: CT IMAGING | Age: 68
Discharge: HOME | End: 2023-11-27
Attending: INTERNAL MEDICINE
Payer: MEDICARE

## 2023-11-27 DIAGNOSIS — C20 MALIGNANT NEOPLASM OF RECTUM (HCC): ICD-10-CM

## 2023-11-27 LAB
PERFORMED ON: NORMAL
POC CREATININE: 0.6 MG/DL (ref 0.6–1.2)
POC SAMPLE TYPE: NORMAL

## 2023-11-27 PROCEDURE — 71260 CT THORAX DX C+: CPT

## 2023-11-27 PROCEDURE — 82565 ASSAY OF CREATININE: CPT

## 2023-11-27 PROCEDURE — 6360000004 HC RX CONTRAST MEDICATION: Performed by: INTERNAL MEDICINE

## 2023-11-27 RX ADMIN — IOPAMIDOL 75 ML: 755 INJECTION, SOLUTION INTRAVENOUS at 07:37

## 2023-11-27 RX ADMIN — IOPAMIDOL 50 ML: 612 INJECTION, SOLUTION INTRAVENOUS at 07:37

## 2023-12-04 ENCOUNTER — HOSPITAL ENCOUNTER (OUTPATIENT)
Dept: ULTRASOUND IMAGING | Age: 68
Discharge: HOME | End: 2023-12-04
Payer: MEDICARE

## 2023-12-04 DIAGNOSIS — C20 MALIGNANT NEOPLASM OF RECTUM (HCC): ICD-10-CM

## 2023-12-04 DIAGNOSIS — E04.2 MULTIPLE THYROID NODULES: ICD-10-CM

## 2023-12-04 PROCEDURE — 76536 US EXAM OF HEAD AND NECK: CPT

## 2024-06-03 ENCOUNTER — HOSPITAL ENCOUNTER (OUTPATIENT)
Dept: CT IMAGING | Age: 69
Discharge: HOME | End: 2024-06-03
Payer: MEDICARE

## 2024-06-03 DIAGNOSIS — C20 MALIGNANT NEOPLASM OF RECTUM (HCC): ICD-10-CM

## 2024-06-03 LAB
PERFORMED ON: NORMAL
POC CREATININE: 0.6 MG/DL (ref 0.6–1.2)
POC SAMPLE TYPE: NORMAL

## 2024-06-03 PROCEDURE — 82565 ASSAY OF CREATININE: CPT

## 2024-06-03 PROCEDURE — 71260 CT THORAX DX C+: CPT

## 2024-06-03 PROCEDURE — 6360000004 HC RX CONTRAST MEDICATION: Performed by: INTERNAL MEDICINE

## 2024-06-03 RX ADMIN — IOPAMIDOL 50 ML: 612 INJECTION, SOLUTION INTRAVENOUS at 10:43

## 2024-06-03 RX ADMIN — IOPAMIDOL 75 ML: 755 INJECTION, SOLUTION INTRAVENOUS at 10:44

## 2024-06-04 ENCOUNTER — TELEPHONE (OUTPATIENT)
Dept: INTERNAL MEDICINE CLINIC | Age: 69
End: 2024-06-04

## 2024-06-21 ENCOUNTER — OFFICE VISIT (OUTPATIENT)
Dept: INTERNAL MEDICINE CLINIC | Age: 69
End: 2024-06-21

## 2024-06-21 VITALS
SYSTOLIC BLOOD PRESSURE: 124 MMHG | HEART RATE: 69 BPM | OXYGEN SATURATION: 98 % | DIASTOLIC BLOOD PRESSURE: 70 MMHG | WEIGHT: 161 LBS | BODY MASS INDEX: 25.22 KG/M2

## 2024-06-21 DIAGNOSIS — Z00.00 INITIAL MEDICARE ANNUAL WELLNESS VISIT: Primary | ICD-10-CM

## 2024-06-21 DIAGNOSIS — Z00.00 PREVENTATIVE HEALTH CARE: ICD-10-CM

## 2024-06-21 ASSESSMENT — PATIENT HEALTH QUESTIONNAIRE - PHQ9
SUM OF ALL RESPONSES TO PHQ QUESTIONS 1-9: 2
2. FEELING DOWN, DEPRESSED OR HOPELESS: SEVERAL DAYS
SUM OF ALL RESPONSES TO PHQ9 QUESTIONS 1 & 2: 2
SUM OF ALL RESPONSES TO PHQ QUESTIONS 1-9: 2
1. LITTLE INTEREST OR PLEASURE IN DOING THINGS: SEVERAL DAYS

## 2024-06-21 ASSESSMENT — LIFESTYLE VARIABLES
HOW MANY STANDARD DRINKS CONTAINING ALCOHOL DO YOU HAVE ON A TYPICAL DAY: PATIENT DOES NOT DRINK
HOW OFTEN DO YOU HAVE A DRINK CONTAINING ALCOHOL: MONTHLY OR LESS

## 2024-06-21 NOTE — PROGRESS NOTES
Medicare Annual Wellness Visit    Leigh De La Cruz is here for Medicare AWV (Wants to make sure tyroids ok )    Assessment & Plan   Initial Medicare annual wellness visit  Recommendations for Preventive Services Due: see orders and patient instructions/AVS.  Recommended screening schedule for the next 5-10 years is provided to the patient in written form: see Patient Instructions/AVS.     No follow-ups on file.     Subjective       Patient's complete Health Risk Assessment and screening values have been reviewed and are found in Flowsheets. The following problems were reviewed today and where indicated follow up appointments were made and/or referrals ordered.    Positive Risk Factor Screenings with Interventions:       Cognitive:      Words recalled: 2 Words Recalled     Total Score Interpretation: Abnormal Mini-Cog  Interventions:  See AVS for additional education material           General HRA Questions:  Select all that apply: (!) Stress    Stress Interventions:  See AVS for additional education material      Activity, Diet, and Weight:  On average, how many days per week do you engage in moderate to strenuous exercise (like a brisk walk)?: 7 days  On average, how many minutes do you engage in exercise at this level?: 40 min    Do you eat balanced/healthy meals regularly?: (!) No    Body mass index is 25.22 kg/m².    Do you eat balanced/healthy meals regularly Interventions:  See AVS for additional education material          Vision Screen:  Do you have difficulty driving, watching TV, or doing any of your daily activities because of your eyesight?: (!) Yes  Have you had an eye exam within the past year?: Yes  No results found.    Interventions:   See AVS for additional education material    Safety:  Do you have non-slip mats or non-slip surfaces or shower bars or grab bars in your shower or bathtub?: (!) No  Interventions:  See AVS for additional education material     Advanced Directives:  Do you have a Living

## 2024-06-21 NOTE — PATIENT INSTRUCTIONS

## 2024-06-27 ENCOUNTER — NURSE ONLY (OUTPATIENT)
Dept: INTERNAL MEDICINE CLINIC | Age: 69
End: 2024-06-27
Payer: MEDICARE

## 2024-06-27 DIAGNOSIS — Z00.00 PREVENTATIVE HEALTH CARE: ICD-10-CM

## 2024-06-27 LAB
ALBUMIN SERPL-MCNC: 3.9 G/DL (ref 3.4–5)
ALBUMIN/GLOB SERPL: 1.4 {RATIO} (ref 1.1–2.2)
ALP SERPL-CCNC: 63 U/L (ref 40–129)
ALT SERPL-CCNC: 17 U/L (ref 10–40)
ANION GAP SERPL CALCULATED.3IONS-SCNC: 12 MMOL/L (ref 3–16)
AST SERPL-CCNC: 12 U/L (ref 15–37)
BASOPHILS # BLD: 0 K/UL (ref 0–0.2)
BASOPHILS NFR BLD: 0.6 %
BILIRUB SERPL-MCNC: 0.5 MG/DL (ref 0–1)
BUN SERPL-MCNC: 17 MG/DL (ref 7–20)
CALCIUM SERPL-MCNC: 8.9 MG/DL (ref 8.3–10.6)
CHLORIDE SERPL-SCNC: 106 MMOL/L (ref 99–110)
CHOLEST SERPL-MCNC: 186 MG/DL (ref 0–199)
CO2 SERPL-SCNC: 24 MMOL/L (ref 21–32)
CREAT SERPL-MCNC: 0.6 MG/DL (ref 0.6–1.2)
DEPRECATED RDW RBC AUTO: 12.9 % (ref 12.4–15.4)
EOSINOPHIL # BLD: 0 K/UL (ref 0–0.6)
EOSINOPHIL NFR BLD: 1 %
GFR SERPLBLD CREATININE-BSD FMLA CKD-EPI: >90 ML/MIN/{1.73_M2}
GLUCOSE SERPL-MCNC: 105 MG/DL (ref 70–99)
HCT VFR BLD AUTO: 39.6 % (ref 36–48)
HDLC SERPL-MCNC: 74 MG/DL (ref 40–60)
HGB BLD-MCNC: 13.3 G/DL (ref 12–16)
LDLC SERPL CALC-MCNC: 96 MG/DL
LYMPHOCYTES # BLD: 0.7 K/UL (ref 1–5.1)
LYMPHOCYTES NFR BLD: 16.9 %
MCH RBC QN AUTO: 31.6 PG (ref 26–34)
MCHC RBC AUTO-ENTMCNC: 33.6 G/DL (ref 31–36)
MCV RBC AUTO: 93.9 FL (ref 80–100)
MONOCYTES # BLD: 0.4 K/UL (ref 0–1.3)
MONOCYTES NFR BLD: 9.2 %
NEUTROPHILS # BLD: 3.1 K/UL (ref 1.7–7.7)
NEUTROPHILS NFR BLD: 72.3 %
PLATELET # BLD AUTO: 149 K/UL (ref 135–450)
PMV BLD AUTO: 8.4 FL (ref 5–10.5)
POTASSIUM SERPL-SCNC: 4.3 MMOL/L (ref 3.5–5.1)
PROT SERPL-MCNC: 6.6 G/DL (ref 6.4–8.2)
RBC # BLD AUTO: 4.22 M/UL (ref 4–5.2)
SODIUM SERPL-SCNC: 142 MMOL/L (ref 136–145)
TRIGL SERPL-MCNC: 78 MG/DL (ref 0–150)
VLDLC SERPL CALC-MCNC: 16 MG/DL
WBC # BLD AUTO: 4.3 K/UL (ref 4–11)

## 2024-06-27 PROCEDURE — 99999 PR OFFICE/OUTPT VISIT,PROCEDURE ONLY: CPT | Performed by: STUDENT IN AN ORGANIZED HEALTH CARE EDUCATION/TRAINING PROGRAM

## 2024-06-27 PROCEDURE — 36415 COLL VENOUS BLD VENIPUNCTURE: CPT | Performed by: STUDENT IN AN ORGANIZED HEALTH CARE EDUCATION/TRAINING PROGRAM

## 2024-07-09 ENCOUNTER — TELEPHONE (OUTPATIENT)
Dept: INTERNAL MEDICINE CLINIC | Age: 69
End: 2024-07-09

## 2024-07-16 ENCOUNTER — COMMUNITY OUTREACH (OUTPATIENT)
Dept: INTERNAL MEDICINE CLINIC | Age: 69
End: 2024-07-16

## 2024-07-23 ENCOUNTER — HOSPITAL ENCOUNTER (OUTPATIENT)
Dept: WOUND CARE | Age: 69
Discharge: HOME | End: 2024-07-23
Payer: MEDICARE

## 2024-07-23 VITALS
SYSTOLIC BLOOD PRESSURE: 125 MMHG | HEIGHT: 67 IN | HEART RATE: 67 BPM | RESPIRATION RATE: 16 BRPM | WEIGHT: 162.7 LBS | DIASTOLIC BLOOD PRESSURE: 73 MMHG | TEMPERATURE: 97.5 F | BODY MASS INDEX: 25.54 KG/M2

## 2024-07-23 DIAGNOSIS — K94.00 COLOSTOMY COMPLICATION (HCC): ICD-10-CM

## 2024-07-23 DIAGNOSIS — Z93.3 COLOSTOMY STATUS (HCC): ICD-10-CM

## 2024-07-23 DIAGNOSIS — L30.9 STOMA DERMATITIS: Primary | ICD-10-CM

## 2024-07-23 DIAGNOSIS — C20 MALIGNANT NEOPLASM OF RECTUM (HCC): ICD-10-CM

## 2024-07-23 PROCEDURE — 99212 OFFICE O/P EST SF 10 MIN: CPT

## 2024-07-23 PROCEDURE — 99215 OFFICE O/P EST HI 40 MIN: CPT

## 2024-07-23 NOTE — PATIENT INSTRUCTIONS
[facility]  [address]  [address]  [place]  [address]   [phone]        Name: Leigh De La Cruz  : 1955   AGE: 67 y.o.  MRN: [id number]  Today's Date: 2024     Ostomy skin care  Cleanse skin prior to applying a new pouch/wafer with:  [x] Water             [] Cleanse skin with Mild Soap & Water  [x] May Shower    [] Other:        **CALL FOR ANY LEAKING PRIOR TO OSTOMY CHANGE THAT IS DUE 24  Clinic ostomy size:  3.8 TOP TO BOTTOM & 1 3/4 SIDE TO SIDE     Pouch/Wafer/Accessory Product Numbers        [x] Appliance: 1 piece [employer]    [x] Product Numbers: #  [id number]  [x] Other: Accessories:        [x] Barrier Film                                                                   [x] Barrier Ring - [employer]EX     [employer] # [id number]        [] Adhesive Remover                                                  [x] Barrier Strips      [] Ostomy Powder                                                    [] Paste      [] Belt        [] Other:            Topical Treatments to skin around stoma: Do not apply lotions, creams, or ointments to wound bed unless directed.     [x] Apply barrier film/spray to skin around stoma and let it dry  [x] Apply stoma powder to irritated skin around stoma, dust away excess and seal in with barrier film/spray and repeat x2  [] Apply antifungal cream to irritated skin surrounding stoma and work into skin until no longer able to feel cream.  [] Apply antifungal powder to irritated skin surrounding stoma, seal in with barrier film; and repeat  [] Other: ____________________________________     Application of Pouch             LET'S PREPARE     [x] Change your pouch every 3-4 days or when leaking.  [x] Gather supplies needed to change pouch wafer.  [x] Assemble new pouch system before removing old one.  [x] Using the measuring guide or pattern, trace size of opening onto back new pouch system.  [x] Cut out opening.  [x] Remove

## 2024-07-23 NOTE — PROGRESS NOTES
around stoma, dust away excess and seal in with barrier film/spray and repeat x2  [] Apply antifungal cream to irritated skin surrounding stoma and work into skin until no longer able to feel cream.  [] Apply antifungal powder to irritated skin surrounding stoma, seal in with barrier film; and repeat  [] Other: ____________________________________     Application of Pouch             LET'S PREPARE     [x] Change your pouch every 3-4 days or when leaking.  [x] Gather supplies needed to change pouch wafer.  [x] Assemble new pouch system before removing old one.  [x] Using the measuring guide or pattern, trace size of opening onto back new pouch system.  [x] Cut out opening.  [x] Remove the control backing and set aside assembled pouch  [x] Remove worn appliance by gently pulling away from skin. Use Adhesive Remover if needed  [x] Look at back of the pouch before throwing away to see how it is worn.   [x] Wash skin with warm water, rinse and pat dry.    [x] Inspect  skin for redness or irritation.              LET'S APPLY       [x] Apply convexity rings around madisyn-stoma.    [x] Apply wafer/pouch system over stoma and press down firmly starting around stoma and working outward.  [x] Remove control backing paper tape border if applicable and press to skin.                                      [x] Close the bottom of pouch.  [x] Apply barrier strips to outside edges of pouch.                  [x] Lay/Sit quietly for 15-20 min to allow adhesives to mold to skin.  [] Other: _____________________________________     Emptying Pouch  Colostomy/Ileostomy  [x] Empty  pouch when 1/3 full of gas, stool.  Clean bottom edge with damp toilet paper or bathroom wipe and close pouch.  [x] For non-drainable pouch - remove when 1/2 full and throw away.  Clean wafer flange (ring) with toilet tissue or damp paper towel before reapplying new pouch        Other:  [x] Shower or Swim Care Pat pouch dry, Use hair dryer on cool setting to dry

## 2024-07-29 ENCOUNTER — HOSPITAL ENCOUNTER (OUTPATIENT)
Dept: WOUND CARE | Age: 69
Discharge: HOME | End: 2024-07-29
Payer: MEDICARE

## 2024-07-29 VITALS
RESPIRATION RATE: 18 BRPM | SYSTOLIC BLOOD PRESSURE: 125 MMHG | TEMPERATURE: 97.9 F | DIASTOLIC BLOOD PRESSURE: 73 MMHG | BODY MASS INDEX: 25.66 KG/M2 | WEIGHT: 163.8 LBS | HEART RATE: 73 BPM

## 2024-07-29 PROCEDURE — 99214 OFFICE O/P EST MOD 30 MIN: CPT

## 2024-07-29 PROCEDURE — 99211 OFF/OP EST MAY X REQ PHY/QHP: CPT

## 2024-07-29 ASSESSMENT — PAIN SCALES - GENERAL: PAINLEVEL_OUTOF10: 0

## 2024-07-29 NOTE — PROGRESS NOTES
not apply lotions, creams, or ointments to wound bed unless directed.     [x] Apply barrier film/spray to skin around stoma and let it dry  [x] Apply stoma powder to irritated skin around stoma, dust away excess and seal in with barrier film/spray and repeat x2  [] Apply antifungal cream to irritated skin surrounding stoma and work into skin until no longer able to feel cream.  [] Apply antifungal powder to irritated skin surrounding stoma, seal in with barrier film; and repeat  [] Other: ____________________________________     Application of Pouch             LET'S PREPARE     [x] Change your pouch every 3-4 days or when leaking.  [x] Gather supplies needed to change pouch wafer.  [x] Assemble new pouch system before removing old one.  [x] Using the measuring guide or pattern, trace size of opening onto back new pouch system.  [x] Cut out opening.  [x] Remove the control backing and set aside assembled pouch  [x] Remove worn appliance by gently pulling away from skin. Use Adhesive Remover if needed  [x] Look at back of the pouch before throwing away to see how it is worn.   [x] Wash skin with warm water, rinse and pat dry.    [x] Inspect  skin for redness or irritation.              LET'S APPLY       [x] Apply convexity rings around madisyn-stoma.    [x] Apply wafer/pouch system over stoma and press down firmly starting around stoma and working outward.  [x] Remove control backing paper tape border if applicable and press to skin.                                      [x] Close the bottom of pouch.  [x] Apply barrier strips to outside edges of pouch.                  [x] Lay/Sit quietly for 15-20 min to allow adhesives to mold to skin.  [] Other: _____________________________________     Emptying Pouch  Colostomy/Ileostomy  [x] Empty  pouch when 1/3 full of gas, stool.  Clean bottom edge with damp toilet paper or bathroom wipe and close pouch.  [x] For non-drainable pouch - remove when 1/2 full and throw away.  Clean

## 2024-07-29 NOTE — PATIENT INSTRUCTIONS
Electronically signed by Yvonne Piedra RN on 7/29/2024 at 3:36 PM                            Wound Care Center Information: Should you experience any significant changes in your wound(s) or have questions about your wound care, please contact the [facility] Wound Center at [phone] MONDAY - THURSDAY 8:30 am - 4:30 pm and Friday 8:30 am - 1:00 pm.  If you need help with your wound outside these hours and cannot wait until we are again available, contact your PCP or go to the hospital emergency room.      PLEASE NOTE: IF YOU ARE UNABLE TO OBTAIN WOUND SUPPLIES, CONTINUE TO USE THE SUPPLIES YOU HAVE AVAILABLE UNTIL YOU ARE ABLE TO REACH US. IT IS MOST IMPORTANT TO KEEP THE WOUND COVERED AT ALL TIMES.          Provider Signature/Date/Time:_____________________________________________     ROSE MARY HUIZAR CNP  [phone]

## 2024-08-05 ENCOUNTER — HOSPITAL ENCOUNTER (OUTPATIENT)
Dept: WOUND CARE | Age: 69
Discharge: HOME | End: 2024-08-05
Payer: MEDICARE

## 2024-08-05 ENCOUNTER — APPOINTMENT (OUTPATIENT)
Dept: WOUND CARE | Age: 69
End: 2024-08-05
Payer: MEDICARE

## 2024-08-05 VITALS
DIASTOLIC BLOOD PRESSURE: 69 MMHG | HEART RATE: 72 BPM | BODY MASS INDEX: 25.45 KG/M2 | WEIGHT: 162.48 LBS | SYSTOLIC BLOOD PRESSURE: 116 MMHG | RESPIRATION RATE: 16 BRPM | TEMPERATURE: 98.8 F

## 2024-08-05 PROCEDURE — 99211 OFF/OP EST MAY X REQ PHY/QHP: CPT

## 2024-08-05 PROCEDURE — 99213 OFFICE O/P EST LOW 20 MIN: CPT

## 2024-08-05 ASSESSMENT — PAIN SCALES - GENERAL: PAINLEVEL_OUTOF10: 0

## 2024-08-05 NOTE — PATIENT INSTRUCTIONS
[facility]ound /Ostomy [facility]  [address]  [address]  [place], [place]  [address] [phone]        Name: Leigh De La Cruz  : 1955   AGE: 67 y.o.  MRN: [id number]  Today's Date: 2024     Ostomy skin care  Cleanse skin prior to applying a new pouch/wafer with:  [x] Water             [] Cleanse skin with Mild Soap & Water  [x] May Shower    [] Other:            Clinic ostomy size:  1 1/8 TOP TO BOTTOM & 1 3/4 SIDE TO SIDE    Pouch/Wafer/Accessory Product Numbers        [x] Appliance: 2 piece [place]  [x] Product Numbers: [id number]; Bag- [id number]  [x] Other: Accessories:        [x] Barrier Film                                                                    [x] Barrier Ring           [] Adhesive Remover                                                  [x] Barrier Strips      [] Ostomy Powder                                                    [] Paste      [] Belt        [] Other:            Topical Treatments to skin around stoma: Do not apply lotions, creams, or ointments to wound bed unless directed.     [x] Apply barrier film/spray to skin around stoma and let it dry  [x] Apply stoma powder to irritated skin around stoma, dust away excess and seal in with barrier film/spray and repeat x2  [] Apply antifungal cream to irritated skin surrounding stoma and work into skin until no longer able to feel cream.  [] Apply antifungal powder to irritated skin surrounding stoma, seal in with barrier film; and repeat  [] Other: ____________________________________     Application of Pouch             LET'S PREPARE     [x] Change your pouch every 3-4 days or when leaking.  [x] Gather supplies needed to change pouch wafer.  [x] Assemble new pouch system before removing old one.  [x] Using the measuring guide or pattern, trace size of opening onto back new pouch system.  [x] Cut out opening.  [x] Remove the control backing and set aside assembled pouch  [x] Remove worn appliance by gently

## 2024-08-05 NOTE — PROGRESS NOTES
Pouch  Colostomy/Ileostomy  [x] Empty  pouch when 1/3 full of gas, stool.  Clean bottom edge with damp toilet paper or bathroom wipe and close pouch.  [x] For non-drainable pouch - remove when 1/2 full and throw away.  Clean wafer flange (ring) with toilet tissue or damp paper towel before reapplying new pouch        Other:  [x] Shower or Swim Care Pat pouch dry, Use hair dryer on cool setting to dry back of pouch and border and Reapply barrier extenders or tape.  [x] Odor Control with deodorizer into bottom of pouch after each emptying  [x] Lubricating gel to pouch to help emptying of thick stool  [] Other: See additional instructions       Contact Ostomy Care Nurse Practitioner  [x] leaking issues and skin irritation        FOLLOW UP CARE:            Return Appointment:  DME/Wound Dressing Supplies Provided by: [facility]  (Please call them directly to reorder supplies when you start to run low on your supplies)     ECF or Home Healthcare:      Return Appointment: 1 Week with Rose Mary Vicente CNP  [] Wound Assessment with a nurse on:                 [] Orders placed during your visit:           : YVONNE  Electronically signed by Yvonne Piedra RN on 8/5/2024 at 1:11 PM                      Wound Care Center Information: Should you experience any significant changes in your wound(s) or have questions about your wound care, please contact the [facility] Wound Center at [phone] MONDAY - THURSDAY 8:30 am - 4:30 pm and Friday 8:30 am - 1:00 pm.  If you need help with your wound outside these hours and cannot wait until we are again available, contact your PCP or go to the hospital emergency room.      PLEASE NOTE: IF YOU ARE UNABLE TO OBTAIN WOUND SUPPLIES, CONTINUE TO USE THE SUPPLIES YOU HAVE AVAILABLE UNTIL YOU ARE ABLE TO REACH US. IT IS MOST IMPORTANT TO KEEP THE WOUND COVERED AT ALL TIMES.          Provider Signature/Date/Time:_____________________________________________     ROSE MARY VICENTE CNP  ([id number])

## 2024-08-09 RX ORDER — PAROXETINE HYDROCHLORIDE 20 MG/1
20 TABLET, FILM COATED ORAL DAILY
Qty: 90 TABLET | Refills: 0 | Status: SHIPPED | OUTPATIENT
Start: 2024-08-09

## 2024-08-09 NOTE — TELEPHONE ENCOUNTER
Medication:   Requested Prescriptions     Pending Prescriptions Disp Refills    PARoxetine (PAXIL) 20 MG tablet [Pharmacy Med Name: PARoxetine HCl 20 MG Oral Tablet] 90 tablet 0     Sig: Take 1 tablet by mouth once daily       Last Filled:      Patient Phone Number: [phone] (home)     Last appt: 6/21/2024   Next appt: Visit date not found  Future Appointments   Date Time Provider Department Center   8/12/2024  2:00 PM Luly Vicente, APRN - CNP [facility]

## 2024-08-12 ENCOUNTER — HOSPITAL ENCOUNTER (OUTPATIENT)
Dept: WOUND CARE | Age: 69
Discharge: HOME | End: 2024-08-12
Payer: MEDICARE

## 2024-08-12 VITALS
DIASTOLIC BLOOD PRESSURE: 67 MMHG | HEART RATE: 76 BPM | SYSTOLIC BLOOD PRESSURE: 124 MMHG | TEMPERATURE: 97.2 F | RESPIRATION RATE: 16 BRPM

## 2024-08-12 PROCEDURE — 99211 OFF/OP EST MAY X REQ PHY/QHP: CPT

## 2024-08-12 PROCEDURE — 99213 OFFICE O/P EST LOW 20 MIN: CPT

## 2024-08-12 NOTE — PATIENT INSTRUCTIONS
[facility] Wound /Ostomy Care [facility]  [address]  [address]  [place]  [address]   [phone]        Name: Leigh De La Cruz  : 1955   AGE: 67 y.o.  MRN: [id number]  Today's Date: 2024     Ostomy skin care  Cleanse skin prior to applying a new pouch/wafer with:  [x] Water             [] Cleanse skin with Mild Soap & Water  [x] May Shower    [] Other:            Clinic ostomy size:  1 1/8 TOP TO BOTTOM & 1 3/4 SIDE TO SIDE    Pouch/Wafer/Accessory Product Numbers        [x] Appliance: 2 piece [place]  [x] Product Numbers: [id number]; Bag- [id number]  [x] Other: Accessories:        [x] Barrier Film                                                                    [x] Barrier Ring           [] Adhesive Remover                                                  [x] Barrier Strips      [] Ostomy Powder                                                    [] Paste      [] Belt        [] Other:            Topical Treatments to skin around stoma: Do not apply lotions, creams, or ointments to wound bed unless directed.     [x] Apply barrier film/spray to skin around stoma and let it dry  [x] Apply stoma powder to irritated skin around stoma, dust away excess and seal in with barrier film/spray and repeat x2  [] Apply antifungal cream to irritated skin surrounding stoma and work into skin until no longer able to feel cream.  [] Apply antifungal powder to irritated skin surrounding stoma, seal in with barrier film; and repeat  [] Other: ____________________________________     Application of Pouch             LET'S PREPARE     [x] Change your pouch every 3-4 days or when leaking.  [x] Gather supplies needed to change pouch wafer.  [x] Assemble new pouch system before removing old one.  [x] Using the measuring guide or pattern, trace size of opening onto back new pouch system.  [x] Cut out opening.  [x] Remove the control backing and set aside assembled pouch  [x] Remove worn appliance by gently

## 2024-09-16 ENCOUNTER — TELEPHONE (OUTPATIENT)
Dept: INTERNAL MEDICINE CLINIC | Age: 69
End: 2024-09-16

## 2024-11-19 NOTE — TELEPHONE ENCOUNTER
Future Appointments   Date Time Provider Department Center   12/2/2024 10:20 AM [facility]       Last appt 6/21/24

## 2024-11-22 RX ORDER — PAROXETINE 20 MG/1
20 TABLET, FILM COATED ORAL DAILY
Qty: 90 TABLET | Refills: 0 | Status: SHIPPED | OUTPATIENT
Start: 2024-11-22

## 2024-11-25 ENCOUNTER — TELEPHONE (OUTPATIENT)
Dept: INTERNAL MEDICINE CLINIC | Age: 69
End: 2024-11-25

## 2024-12-09 ENCOUNTER — HOSPITAL ENCOUNTER (OUTPATIENT)
Dept: CT IMAGING | Age: 69
Discharge: HOME | End: 2024-12-09
Attending: INTERNAL MEDICINE
Payer: MEDICARE

## 2024-12-09 DIAGNOSIS — C20 MALIGNANT NEOPLASM OF RECTUM (HCC): ICD-10-CM

## 2024-12-09 LAB
PERFORMED ON: NORMAL
POC CREATININE: 0.7 MG/DL (ref 0.6–1.2)
POC SAMPLE TYPE: NORMAL

## 2024-12-09 PROCEDURE — 82565 ASSAY OF CREATININE: CPT

## 2024-12-09 PROCEDURE — 6360000004 HC RX CONTRAST MEDICATION: Performed by: INTERNAL MEDICINE

## 2024-12-09 PROCEDURE — 71260 CT THORAX DX C+: CPT

## 2024-12-09 RX ORDER — IOPAMIDOL 755 MG/ML
75 INJECTION, SOLUTION INTRAVASCULAR
Status: COMPLETED | OUTPATIENT
Start: 2024-12-09 | End: 2024-12-09

## 2024-12-09 RX ORDER — IOPAMIDOL 612 MG/ML
50 INJECTION, SOLUTION INTRAVASCULAR
Status: COMPLETED | OUTPATIENT
Start: 2024-12-09 | End: 2024-12-09

## 2024-12-09 RX ADMIN — IOPAMIDOL 75 ML: 755 INJECTION, SOLUTION INTRAVENOUS at 08:35

## 2024-12-09 RX ADMIN — IOPAMIDOL 50 ML: 612 INJECTION, SOLUTION INTRAVENOUS at 08:35

## 2025-02-24 RX ORDER — PAROXETINE 20 MG/1
20 TABLET, FILM COATED ORAL DAILY
Qty: 90 TABLET | Refills: 0 | Status: SHIPPED | OUTPATIENT
Start: 2025-02-24

## 2025-02-24 NOTE — TELEPHONE ENCOUNTER
Medication:   Requested Prescriptions     Pending Prescriptions Disp Refills    PARoxetine (PAXIL) 20 MG tablet [Pharmacy Med Name: PARoxetine HCl 20 MG Oral Tablet] 90 tablet 0     Sig: Take 1 tablet by mouth once daily       Last Filled:      Patient Phone Number: [phone] (home)     Last appt: 6/21/2024   Next appt: Visit date not found  No future appointments.

## 2025-02-27 ENCOUNTER — OFFICE VISIT (OUTPATIENT)
Dept: INTERNAL MEDICINE CLINIC | Age: 70
End: 2025-02-27

## 2025-02-27 VITALS
OXYGEN SATURATION: 99 % | WEIGHT: 169.8 LBS | SYSTOLIC BLOOD PRESSURE: 122 MMHG | HEART RATE: 82 BPM | TEMPERATURE: 98.4 F | DIASTOLIC BLOOD PRESSURE: 72 MMHG | BODY MASS INDEX: 26.59 KG/M2

## 2025-02-27 DIAGNOSIS — H65.91 RIGHT OTITIS MEDIA WITH EFFUSION: Primary | ICD-10-CM

## 2025-02-27 RX ORDER — CITALOPRAM HYDROBROMIDE 40 MG/1
TABLET ORAL
Qty: 30 TABLET | Refills: 3 | Status: SHIPPED | OUTPATIENT
Start: 2025-02-27

## 2025-02-27 RX ORDER — AZITHROMYCIN 250 MG/1
TABLET, FILM COATED ORAL
Qty: 6 TABLET | Refills: 0 | Status: SHIPPED | OUTPATIENT
Start: 2025-02-27 | End: 2025-03-09

## 2025-02-27 RX ORDER — FLUTICASONE PROPIONATE 50 MCG
2 SPRAY, SUSPENSION (ML) NASAL DAILY
Qty: 16 G | Refills: 0 | Status: SHIPPED | OUTPATIENT
Start: 2025-02-27

## 2025-02-27 ASSESSMENT — ENCOUNTER SYMPTOMS
DIARRHEA: 0
SHORTNESS OF BREATH: 0
SORE THROAT: 1
VOMITING: 0
RHINORRHEA: 1
SINUS PRESSURE: 0
COUGH: 0
SINUS PAIN: 0
WHEEZING: 0
NAUSEA: 1

## 2025-02-27 NOTE — PROGRESS NOTES
Leigh De La Cruz (:  1955) is a 69 y.o. female,Established patient, here for evaluation of the following chief complaint(s):  Dizziness (Sinus infection; nauseated; pain in right ear; started about a week ago)    Pt is a 69 year old female with past medical history as noted below.    Active Ambulatory Problems     Diagnosis Date Noted    Acute appendicitis 2013    Nausea & vomiting 2014    Dental abscess 2014    Pulmonary nodules 2014    Enteritis 2015    Intractable nausea and vomiting 2015    Nausea     History of rectal cancer 2015    Dizziness 2015    Speech disturbance 2015    Right otitis media 2017    Acute non-recurrent maxillary sinusitis 2017    Allergic conjunctivitis of both eyes 2017    Neck pain 10/09/2020    Cervical radiculopathy 2020    Polyarthralgia 2020    Peristomal skin complication 2021    Right shoulder tendonitis 2021    Stoma dermatitis 2024    Colostomy complication (HCC) 2024    Colostomy status ([facility]) 2024     Resolved Ambulatory Problems     Diagnosis Date Noted    Rectal cancer metastasized to lung (HCC) 2013    Malignant neoplasm of rectum (HCC) 2014    Encounter to discuss test results 2016    Rectal cancer (HCC) 2017    Colostomy care ([facility]) 2021     Past Medical History:   Diagnosis Date    Arthritis     Cancer (HCC)     Depression      She presents to the office today for complaints of sore throat, chills, nausea, rhinorrhea, and right ear pain that started on . Does report ill exposures at work that she is aware of. Denies fever, vomiting, chest pain, shortness of breath, diarrhea, or headache. Does report an episode of vertigo last week prior to these symptoms occurring. She has been taking Vicks and Dayquil with mild relief. Sore throat is a 2/10 at this time - not worse with swallowing. Denies any ABX use in the last

## 2025-03-10 ENCOUNTER — TELEPHONE (OUTPATIENT)
Dept: INTERNAL MEDICINE CLINIC | Age: 70
End: 2025-03-10

## 2025-03-10 NOTE — TELEPHONE ENCOUNTER
Pt called, she was seen 2/27/25 for an ear infection and cold. She is now experiencing back pain between her shoulder blades, started yesterday. She has not been coughing    What could this be from?    Please advise    Thank you

## 2025-04-23 ENCOUNTER — APPOINTMENT (OUTPATIENT)
Dept: CT IMAGING | Age: 70
DRG: 176 | End: 2025-04-23
Payer: MEDICARE

## 2025-04-23 ENCOUNTER — APPOINTMENT (OUTPATIENT)
Dept: GENERAL RADIOLOGY | Age: 70
DRG: 176 | End: 2025-04-23
Payer: MEDICARE

## 2025-04-23 ENCOUNTER — HOSPITAL ENCOUNTER (INPATIENT)
Age: 70
LOS: 1 days | Discharge: HOME | DRG: 176 | End: 2025-04-24
Attending: STUDENT IN AN ORGANIZED HEALTH CARE EDUCATION/TRAINING PROGRAM | Admitting: STUDENT IN AN ORGANIZED HEALTH CARE EDUCATION/TRAINING PROGRAM
Payer: MEDICARE

## 2025-04-23 DIAGNOSIS — Z71.89 GOALS OF CARE, COUNSELING/DISCUSSION: ICD-10-CM

## 2025-04-23 DIAGNOSIS — I26.99 PULMONARY EMBOLISM WITHOUT ACUTE COR PULMONALE, UNSPECIFIED CHRONICITY, UNSPECIFIED PULMONARY EMBOLISM TYPE (HCC): ICD-10-CM

## 2025-04-23 DIAGNOSIS — I26.99 PULMONARY EMBOLISM, OTHER, UNSPECIFIED CHRONICITY, UNSPECIFIED WHETHER ACUTE COR PULMONALE PRESENT (HCC): Primary | ICD-10-CM

## 2025-04-23 LAB
ALBUMIN SERPL-MCNC: 4.3 G/DL (ref 3.4–5)
ALBUMIN/GLOB SERPL: 1.4 {RATIO} (ref 1.1–2.2)
ALP SERPL-CCNC: 82 U/L (ref 40–129)
ALT SERPL-CCNC: 21 U/L (ref 10–40)
ANION GAP SERPL CALCULATED.3IONS-SCNC: 11 MMOL/L (ref 3–16)
APTT BLD: 23 SEC (ref 22.1–36.4)
AST SERPL-CCNC: 20 U/L (ref 15–37)
BASOPHILS # BLD: 0 K/UL (ref 0–0.2)
BASOPHILS NFR BLD: 0.6 %
BILIRUB SERPL-MCNC: 0.4 MG/DL (ref 0–1)
BUN SERPL-MCNC: 17 MG/DL (ref 7–20)
CALCIUM SERPL-MCNC: 9.5 MG/DL (ref 8.3–10.6)
CHLORIDE SERPL-SCNC: 106 MMOL/L (ref 99–110)
CO2 SERPL-SCNC: 25 MMOL/L (ref 21–32)
CREAT SERPL-MCNC: 0.8 MG/DL (ref 0.6–1.2)
D-DIMER QUANTITATIVE: 0.94 UG/ML FEU (ref 0–0.6)
DEPRECATED RDW RBC AUTO: 12.5 % (ref 12.4–15.4)
EOSINOPHIL # BLD: 0 K/UL (ref 0–0.6)
EOSINOPHIL NFR BLD: 0.9 %
GFR SERPLBLD CREATININE-BSD FMLA CKD-EPI: 79 ML/MIN/{1.73_M2}
GLUCOSE SERPL-MCNC: 98 MG/DL (ref 70–99)
HCT VFR BLD AUTO: 41.3 % (ref 36–48)
HGB BLD-MCNC: 14.4 G/DL (ref 12–16)
LYMPHOCYTES # BLD: 1.2 K/UL (ref 1–5.1)
LYMPHOCYTES NFR BLD: 23.4 %
MCH RBC QN AUTO: 31.6 PG (ref 26–34)
MCHC RBC AUTO-ENTMCNC: 34.8 G/DL (ref 31–36)
MCV RBC AUTO: 90.9 FL (ref 80–100)
MONOCYTES # BLD: 0.5 K/UL (ref 0–1.3)
MONOCYTES NFR BLD: 9.9 %
NEUTROPHILS # BLD: 3.5 K/UL (ref 1.7–7.7)
NEUTROPHILS NFR BLD: 65.2 %
PLATELET # BLD AUTO: 154 K/UL (ref 135–450)
PMV BLD AUTO: 7.7 FL (ref 5–10.5)
POTASSIUM SERPL-SCNC: 4.5 MMOL/L (ref 3.5–5.1)
PROT SERPL-MCNC: 7.3 G/DL (ref 6.4–8.2)
RBC # BLD AUTO: 4.55 M/UL (ref 4–5.2)
SODIUM SERPL-SCNC: 142 MMOL/L (ref 136–145)
TROPONIN, HIGH SENSITIVITY: 8 NG/L (ref 0–14)
TROPONIN, HIGH SENSITIVITY: <6 NG/L (ref 0–14)
WBC # BLD AUTO: 5.3 K/UL (ref 4–11)

## 2025-04-23 PROCEDURE — 6370000000 HC RX 637 (ALT 250 FOR IP): Performed by: NURSE PRACTITIONER

## 2025-04-23 PROCEDURE — 85730 THROMBOPLASTIN TIME PARTIAL: CPT

## 2025-04-23 PROCEDURE — 36415 COLL VENOUS BLD VENIPUNCTURE: CPT

## 2025-04-23 PROCEDURE — 6360000004 HC RX CONTRAST MEDICATION: Performed by: NURSE PRACTITIONER

## 2025-04-23 PROCEDURE — 71045 X-RAY EXAM CHEST 1 VIEW: CPT

## 2025-04-23 PROCEDURE — 84484 ASSAY OF TROPONIN QUANT: CPT

## 2025-04-23 PROCEDURE — 1200000000 HC SEMI PRIVATE

## 2025-04-23 PROCEDURE — 85025 COMPLETE CBC W/AUTO DIFF WBC: CPT

## 2025-04-23 PROCEDURE — 80053 COMPREHEN METABOLIC PANEL: CPT

## 2025-04-23 PROCEDURE — 71260 CT THORAX DX C+: CPT

## 2025-04-23 PROCEDURE — 99285 EMERGENCY DEPT VISIT HI MDM: CPT

## 2025-04-23 PROCEDURE — 85379 FIBRIN DEGRADATION QUANT: CPT

## 2025-04-23 PROCEDURE — 93005 ELECTROCARDIOGRAM TRACING: CPT | Performed by: STUDENT IN AN ORGANIZED HEALTH CARE EDUCATION/TRAINING PROGRAM

## 2025-04-23 RX ORDER — POTASSIUM CHLORIDE 1500 MG/1
40 TABLET, EXTENDED RELEASE ORAL PRN
Status: DISCONTINUED | OUTPATIENT
Start: 2025-04-23 | End: 2025-04-24 | Stop reason: HOSPADM

## 2025-04-23 RX ORDER — HEPARIN SODIUM 1000 [USP'U]/ML
40 INJECTION, SOLUTION INTRAVENOUS; SUBCUTANEOUS PRN
Status: DISCONTINUED | OUTPATIENT
Start: 2025-04-23 | End: 2025-04-23

## 2025-04-23 RX ORDER — IOPAMIDOL 755 MG/ML
50 INJECTION, SOLUTION INTRAVASCULAR
Status: COMPLETED | OUTPATIENT
Start: 2025-04-23 | End: 2025-04-23

## 2025-04-23 RX ORDER — SODIUM CHLORIDE 0.9 % (FLUSH) 0.9 %
5-40 SYRINGE (ML) INJECTION EVERY 12 HOURS SCHEDULED
Status: DISCONTINUED | OUTPATIENT
Start: 2025-04-23 | End: 2025-04-24 | Stop reason: HOSPADM

## 2025-04-23 RX ORDER — POLYETHYLENE GLYCOL 3350 17 G/17G
17 POWDER, FOR SOLUTION ORAL DAILY PRN
Status: DISCONTINUED | OUTPATIENT
Start: 2025-04-23 | End: 2025-04-24 | Stop reason: HOSPADM

## 2025-04-23 RX ORDER — ACETAMINOPHEN 650 MG/1
650 SUPPOSITORY RECTAL EVERY 6 HOURS PRN
Status: DISCONTINUED | OUTPATIENT
Start: 2025-04-23 | End: 2025-04-24 | Stop reason: HOSPADM

## 2025-04-23 RX ORDER — HEPARIN SODIUM 1000 [USP'U]/ML
80 INJECTION, SOLUTION INTRAVENOUS; SUBCUTANEOUS ONCE
Status: DISCONTINUED | OUTPATIENT
Start: 2025-04-23 | End: 2025-04-23

## 2025-04-23 RX ORDER — MAGNESIUM SULFATE IN WATER 40 MG/ML
2000 INJECTION, SOLUTION INTRAVENOUS PRN
Status: DISCONTINUED | OUTPATIENT
Start: 2025-04-23 | End: 2025-04-24 | Stop reason: HOSPADM

## 2025-04-23 RX ORDER — ASPIRIN 81 MG/1
324 TABLET, CHEWABLE ORAL ONCE
Status: COMPLETED | OUTPATIENT
Start: 2025-04-23 | End: 2025-04-23

## 2025-04-23 RX ORDER — ONDANSETRON 2 MG/ML
4 INJECTION INTRAMUSCULAR; INTRAVENOUS EVERY 6 HOURS PRN
Status: DISCONTINUED | OUTPATIENT
Start: 2025-04-23 | End: 2025-04-24 | Stop reason: HOSPADM

## 2025-04-23 RX ORDER — ONDANSETRON 4 MG/1
4 TABLET, ORALLY DISINTEGRATING ORAL EVERY 8 HOURS PRN
Status: DISCONTINUED | OUTPATIENT
Start: 2025-04-23 | End: 2025-04-24 | Stop reason: HOSPADM

## 2025-04-23 RX ORDER — ACETAMINOPHEN 325 MG/1
650 TABLET ORAL EVERY 6 HOURS PRN
Status: DISCONTINUED | OUTPATIENT
Start: 2025-04-23 | End: 2025-04-24 | Stop reason: HOSPADM

## 2025-04-23 RX ORDER — HEPARIN SODIUM 1000 [USP'U]/ML
80 INJECTION, SOLUTION INTRAVENOUS; SUBCUTANEOUS PRN
Status: DISCONTINUED | OUTPATIENT
Start: 2025-04-23 | End: 2025-04-23

## 2025-04-23 RX ORDER — HEPARIN SODIUM 1000 [USP'U]/ML
5800 INJECTION, SOLUTION INTRAVENOUS; SUBCUTANEOUS ONCE
Status: COMPLETED | OUTPATIENT
Start: 2025-04-23 | End: 2025-04-24

## 2025-04-23 RX ORDER — POTASSIUM CHLORIDE 7.45 MG/ML
10 INJECTION INTRAVENOUS PRN
Status: DISCONTINUED | OUTPATIENT
Start: 2025-04-23 | End: 2025-04-24 | Stop reason: HOSPADM

## 2025-04-23 RX ORDER — HEPARIN SODIUM 10000 [USP'U]/100ML
0-4000 INJECTION, SOLUTION INTRAVENOUS CONTINUOUS
Status: DISCONTINUED | OUTPATIENT
Start: 2025-04-23 | End: 2025-04-24 | Stop reason: HOSPADM

## 2025-04-23 RX ORDER — SODIUM CHLORIDE 0.9 % (FLUSH) 0.9 %
5-40 SYRINGE (ML) INJECTION PRN
Status: DISCONTINUED | OUTPATIENT
Start: 2025-04-23 | End: 2025-04-24 | Stop reason: HOSPADM

## 2025-04-23 RX ORDER — SODIUM CHLORIDE 9 MG/ML
INJECTION, SOLUTION INTRAVENOUS PRN
Status: DISCONTINUED | OUTPATIENT
Start: 2025-04-23 | End: 2025-04-24 | Stop reason: HOSPADM

## 2025-04-23 RX ORDER — ENOXAPARIN SODIUM 100 MG/ML
40 INJECTION SUBCUTANEOUS DAILY
Status: DISCONTINUED | OUTPATIENT
Start: 2025-04-24 | End: 2025-04-23

## 2025-04-23 RX ADMIN — ASPIRIN 324 MG: 81 TABLET, CHEWABLE ORAL at 18:36

## 2025-04-23 RX ADMIN — IOPAMIDOL 50 ML: 755 INJECTION, SOLUTION INTRAVENOUS at 22:07

## 2025-04-23 ASSESSMENT — PAIN - FUNCTIONAL ASSESSMENT: PAIN_FUNCTIONAL_ASSESSMENT: NONE - DENIES PAIN

## 2025-04-23 ASSESSMENT — HEART SCORE: ECG: NORMAL

## 2025-04-24 ENCOUNTER — HOSPITAL ENCOUNTER (INPATIENT)
Dept: VASCULAR LAB | Age: 70
Discharge: HOME | DRG: 176 | End: 2025-04-26
Payer: MEDICARE

## 2025-04-24 VITALS
HEIGHT: 67 IN | OXYGEN SATURATION: 98 % | RESPIRATION RATE: 18 BRPM | BODY MASS INDEX: 26.7 KG/M2 | HEART RATE: 74 BPM | TEMPERATURE: 98.2 F | SYSTOLIC BLOOD PRESSURE: 130 MMHG | WEIGHT: 170.1 LBS | DIASTOLIC BLOOD PRESSURE: 61 MMHG

## 2025-04-24 LAB
ANION GAP SERPL CALCULATED.3IONS-SCNC: 11 MMOL/L (ref 3–16)
ANTI-XA UNFRAC HEPARIN: 0.81 IU/ML (ref 0.3–0.7)
ANTI-XA UNFRAC HEPARIN: >1.1 IU/ML (ref 0.3–0.7)
BASOPHILS # BLD: 0 K/UL (ref 0–0.2)
BASOPHILS NFR BLD: 0.3 %
BUN SERPL-MCNC: 15 MG/DL (ref 7–20)
CALCIUM SERPL-MCNC: 8.8 MG/DL (ref 8.3–10.6)
CHLORIDE SERPL-SCNC: 108 MMOL/L (ref 99–110)
CO2 SERPL-SCNC: 23 MMOL/L (ref 21–32)
CREAT SERPL-MCNC: 0.7 MG/DL (ref 0.6–1.2)
DEPRECATED RDW RBC AUTO: 12.7 % (ref 12.4–15.4)
ECHO BSA: 1.85 M2
EKG ATRIAL RATE: 64 BPM
EKG DIAGNOSIS: NORMAL
EKG P AXIS: 54 DEGREES
EKG P-R INTERVAL: 196 MS
EKG Q-T INTERVAL: 416 MS
EKG QRS DURATION: 84 MS
EKG QTC CALCULATION (BAZETT): 429 MS
EKG R AXIS: 10 DEGREES
EKG T AXIS: 34 DEGREES
EKG VENTRICULAR RATE: 64 BPM
EOSINOPHIL # BLD: 0.1 K/UL (ref 0–0.6)
EOSINOPHIL NFR BLD: 1.1 %
GFR SERPLBLD CREATININE-BSD FMLA CKD-EPI: >90 ML/MIN/{1.73_M2}
GLUCOSE SERPL-MCNC: 113 MG/DL (ref 70–99)
HCT VFR BLD AUTO: 40.7 % (ref 36–48)
HGB BLD-MCNC: 13.7 G/DL (ref 12–16)
LYMPHOCYTES # BLD: 1.1 K/UL (ref 1–5.1)
LYMPHOCYTES NFR BLD: 21 %
MAGNESIUM SERPL-MCNC: 2.25 MG/DL (ref 1.8–2.4)
MCH RBC QN AUTO: 31.1 PG (ref 26–34)
MCHC RBC AUTO-ENTMCNC: 33.7 G/DL (ref 31–36)
MCV RBC AUTO: 92.3 FL (ref 80–100)
MONOCYTES # BLD: 0.6 K/UL (ref 0–1.3)
MONOCYTES NFR BLD: 11.3 %
NEUTROPHILS # BLD: 3.5 K/UL (ref 1.7–7.7)
NEUTROPHILS NFR BLD: 66.3 %
PLATELET # BLD AUTO: 141 K/UL (ref 135–450)
PMV BLD AUTO: 8 FL (ref 5–10.5)
POTASSIUM SERPL-SCNC: 3.5 MMOL/L (ref 3.5–5.1)
RBC # BLD AUTO: 4.4 M/UL (ref 4–5.2)
SODIUM SERPL-SCNC: 142 MMOL/L (ref 136–145)
WBC # BLD AUTO: 5.4 K/UL (ref 4–11)

## 2025-04-24 PROCEDURE — 93970 EXTREMITY STUDY: CPT

## 2025-04-24 PROCEDURE — 6360000002 HC RX W HCPCS: Performed by: NURSE PRACTITIONER

## 2025-04-24 PROCEDURE — 80048 BASIC METABOLIC PNL TOTAL CA: CPT

## 2025-04-24 PROCEDURE — 36415 COLL VENOUS BLD VENIPUNCTURE: CPT

## 2025-04-24 PROCEDURE — 99222 1ST HOSP IP/OBS MODERATE 55: CPT | Performed by: SURGERY

## 2025-04-24 PROCEDURE — 6370000000 HC RX 637 (ALT 250 FOR IP): Performed by: STUDENT IN AN ORGANIZED HEALTH CARE EDUCATION/TRAINING PROGRAM

## 2025-04-24 PROCEDURE — 85520 HEPARIN ASSAY: CPT

## 2025-04-24 PROCEDURE — 93010 ELECTROCARDIOGRAM REPORT: CPT | Performed by: INTERNAL MEDICINE

## 2025-04-24 PROCEDURE — 83735 ASSAY OF MAGNESIUM: CPT

## 2025-04-24 PROCEDURE — 85025 COMPLETE CBC W/AUTO DIFF WBC: CPT

## 2025-04-24 PROCEDURE — 94760 N-INVAS EAR/PLS OXIMETRY 1: CPT

## 2025-04-24 RX ORDER — CITALOPRAM HYDROBROMIDE 20 MG/1
40 TABLET ORAL DAILY
Status: DISCONTINUED | OUTPATIENT
Start: 2025-04-24 | End: 2025-04-24 | Stop reason: HOSPADM

## 2025-04-24 RX ADMIN — HEPARIN SODIUM AND DEXTROSE 1300 UNITS/HR: 10000; 5 INJECTION INTRAVENOUS at 00:12

## 2025-04-24 RX ADMIN — ACETAMINOPHEN 650 MG: 325 TABLET ORAL at 15:09

## 2025-04-24 RX ADMIN — CITALOPRAM HYDROBROMIDE 40 MG: 20 TABLET ORAL at 09:51

## 2025-04-24 RX ADMIN — HEPARIN SODIUM 5800 UNITS: 1000 INJECTION INTRAVENOUS; SUBCUTANEOUS at 00:09

## 2025-04-24 ASSESSMENT — ENCOUNTER SYMPTOMS
EYE PAIN: 0
ABDOMINAL PAIN: 0
NAUSEA: 0
RESPIRATORY NEGATIVE: 1
CONSTIPATION: 0
VOMITING: 0
SORE THROAT: 0
DIARRHEA: 0
CHEST TIGHTNESS: 0
BACK PAIN: 0
ANAL BLEEDING: 0
SHORTNESS OF BREATH: 1
NAUSEA: 1
COUGH: 0

## 2025-04-24 ASSESSMENT — PAIN SCALES - GENERAL: PAINLEVEL_OUTOF10: 8

## 2025-04-24 NOTE — ACP (ADVANCE CARE PLANNING)
Advance Care Planning   Healthcare Decision Maker:    Primary Decision Maker: Jackie De La Cruz - Child - [phone]      Today we documented Decision Maker(s) consistent with Legal Next of Kin hierarchy.       Electronically signed by SILVANA Tavera on 4/24/2025 at 1:54 PM

## 2025-04-24 NOTE — PLAN OF CARE
Problem: Discharge Planning  Goal: Discharge to home or other facility with appropriate resources  4/24/2025 1456 by Amy Dennis RN  Outcome: Progressing  4/24/2025 0519 by Greer Ryan RN  Outcome: Progressing     Problem: ABCDS Injury Assessment  Goal: Absence of physical injury  4/24/2025 1456 by Amy Dennis RN  Outcome: Progressing  4/24/2025 0519 by Greer Ryan RN  Outcome: Progressing     Problem: Skin/Tissue Integrity - Adult  Goal: Skin integrity remains intact  4/24/2025 1456 by Amy Dennis RN  Outcome: Progressing  4/24/2025 0519 by Greer Ryan RN  Outcome: Progressing  Goal: Incisions, wounds, or drain sites healing without S/S of infection  4/24/2025 1456 by Amy Dennis RN  Outcome: Progressing  4/24/2025 0519 by Greer Ryan RN  Outcome: Progressing  Goal: Oral mucous membranes remain intact  4/24/2025 1456 by Amy Dennis RN  Outcome: Progressing  4/24/2025 0519 by Greer Ryan RN  Outcome: Progressing     Problem: Gastrointestinal - Adult  Goal: Maintains adequate nutritional intake  4/24/2025 1456 by Amy Dennis RN  Outcome: Progressing  4/24/2025 0519 by Greer Ryan RN  Outcome: Progressing  Goal: Establish and maintain optimal ostomy function  4/24/2025 1456 by Amy Dennis RN  Outcome: Progressing  4/24/2025 0519 by Greer Ryan RN  Outcome: Progressing

## 2025-04-24 NOTE — H&P
[facility]  History and Physical    Patient's PCP: Jeramie Steiner MD  Code Status: Full Code  History Obtained From:  patient    CC:\"  Chief Complaint   Patient presents with    Chest Pain     Pt arrives with c/o chest pain middle 9/10 pain, resolved now. - heart hx. Nausea, and diff breathing started today. .    \"    HPI:Leigh De La Cruz is a 69 y.o. female with past medical history significant for rectal cancer presents with chest pain. She reports chest pain began yesterday evening and lasted about 20 minutes. She denies any fever or dyspnea. She denies any leg swelling. She was found to have PE on CT chest scan in the ED. She has not had recent travel. She has no prior history of blood clots, but she her mother did have a pulmonary embolism.    Past Medical / Surgical History:    Past Medical History:   Diagnosis Date    Acute appendicitis 11/19/2013    Arthritis     Cancer (HCC)     Rectal, lung    Colostomy care (HCC) 2/23/2021    Depression     Malignant neoplasm of rectum (HCC) 12/29/2014    Malignant neoplasm of rectum (HCC) 12/29/2014    Peristomal skin complication 2/23/2021     Past Surgical History:   Procedure Laterality Date    ABDOMEN SURGERY      rectal ca    APPENDECTOMY      LAPAROSCOPIC APPENDECTOMY N/A 11/19/13    attempted open appy done    MANDIBLE SURGERY      overbite repair    [place] COLOSTOMY/SKIN LEVEL CECOSTOMY      TUNNELED VENOUS PORT PLACEMENT Left 01/30/2012    Smart Port    VARICOSE VEIN SURGERY Left        Medications Prior to Admission:    No current facility-administered medications on file prior to encounter.     Current Outpatient Medications on File Prior to Encounter   Medication Sig Dispense Refill    citalopram (CELEXA) 40 MG tablet TAKE ONE TABLET BY MOUTH ONCE DAILY 30 tablet 3    fluticasone (FLONASE) 50 MCG/ACT nasal spray 2 sprays by Each Nostril route daily 16 g 0    PARoxetine (PAXIL) 20 MG tablet Take 1 tablet by mouth once

## 2025-04-24 NOTE — ED NOTES
ED TO INPATIENT SBAR HANDOFF    Patient Name: Leigh De La Cruz   Preferred Name: Leigh  : 1955  69 y.o.   Family/Caregiver Present: no   Code Status Order: Full Code  PO Status: NPO:No  Telemetry Order: Yes  C-SSRS: Risk of Suicide: No Risk  Sitter no   Restraints:     Sepsis Risk Score      Situation  Chief Complaint   Patient presents with    Chest Pain     Pt arrives with c/o chest pain middle 9/10 pain, resolved now. - heart hx. Nausea, and diff breathing started today. .      Brief Description of Patient's Condition: Pt presented to ED for chest tightness.   Mental Status: oriented and alert  Arrived from:Home  Imaging:   CT CHEST PULMONARY EMBOLISM W CONTRAST   Final Result   Middle lobe segmental pulmonary embolus.      The findings were sent to the Radiology Results Communication Center at 10:41   pm on 2025 to be communicated to a licensed caregiver.         XR CHEST PORTABLE   Final Result   No acute process.           Abnormal labs:   Abnormal Labs Reviewed   D-DIMER, QUANTITATIVE - Abnormal; Notable for the following components:       Result Value    D-Dimer, Quant 0.94 (*)     All other components within normal limits       Background  Allergies:   Allergies   Allergen Reactions    Levaquin [Levofloxacin] Dizziness or Vertigo    Meperidine Nausea Only    Fentanyl Itching and Nausea And Vomiting     History:   Past Medical History:   Diagnosis Date    Acute appendicitis 2013    Arthritis     Cancer (HCC)     Rectal, lung    Colostomy care ([facility]) 2021    Depression     Malignant neoplasm of rectum (HCC) 2014    Malignant neoplasm of rectum (HCC) 2014    Peristomal skin complication 2021       Assessment  Vitals:    Level of Consciousness: Alert (0)   Vitals:    25 1800 25 2114   BP: (!) 160/114 (!) 149/87   Pulse: 74 71   Resp: 18 16   Temp: 98.4 °F (36.9 °C)    TempSrc: Oral    SpO2: 100% 100%   Weight: 72.6 kg (160 lb)    Height: 1.702 m (5' 7\")

## 2025-04-24 NOTE — PROGRESS NOTES
4 Eyes Skin Assessment     NAME:  Leigh De La Cruz  YOB: 1955  MEDICAL RECORD NUMBER:  [id number]    The patient is being assessed for  Admission    I agree that at least one RN has performed a thorough Head to Toe Skin Assessment on the patient. ALL assessment sites listed below have been assessed.      Areas assessed by both nurses:    Head, Face, Ears, Shoulders, Back, Chest, Arms, Elbows, Hands, Sacrum. Buttock, Coccyx, Ischium, and Legs. Feet and Heels        Does the Patient have a Wound? No noted wound(s)       Henri Prevention initiated by RN: No  Wound Care Orders initiated by RN: No    Pressure Injury (Stage 3,4, Unstageable, DTI, NWPT, and Complex wounds) if present, place Wound referral order by RN under : No    New Ostomies, if present place, Ostomy referral order under : No   Colostomy since 2012    Nurse 1 eSignature: Electronically signed by Greer Ryan RN on 4/24/25 at 1:23 AM EDT    **SHARE this note so that the co-signing nurse can place an eSignature**    Nurse 2 eSignature: Electronically signed by Greer Hanson RN on 4/24/25 at 6:48 AM EDT

## 2025-04-24 NOTE — PROGRESS NOTES
Clinical Pharmacy Note  Heparin Dosing       Lab Results   Component Value Date/Time    ANTIXAUHEP 0.81 04/24/2025 11:29 AM      Lab Results   Component Value Date/Time    HGB 13.7 04/24/2025 04:08 AM    HCT 40.7 04/24/2025 04:08 AM     04/24/2025 04:08 AM    INR 0.99 03/22/2018 10:50 AM       Current Infusion Rate: 1080 units/hr    Plan:  Rate: reduce heparin drip to 940 units/hr  Next anti-Xa level: 2000 4/24/25    Pharmacy will continue to monitor and adjust based on anti-Xa results.    Left a detailed message for patient to verify which pharmacy she will be using

## 2025-04-24 NOTE — PROGRESS NOTES
Pt arrived in room 4251 at 0037. Alert and oriented x4. Vital signs stable. Completed heparin infusion handoff with ED nurse, documented in MAR.  Able to ambulate independently without assistive device. Steady gait. Colostomy bag in place. Pt has had colostomy since 2012.  Telemetry applied.   Pt resting in bed, call light within reach, bed alarm on. Denies any needs at this time.

## 2025-04-24 NOTE — PLAN OF CARE
Problem: Discharge Planning  Goal: Discharge to home or other facility with appropriate resources  Outcome: Progressing     Problem: ABCDS Injury Assessment  Goal: Absence of physical injury  Outcome: Progressing     Problem: Skin/Tissue Integrity - Adult  Goal: Skin integrity remains intact  Outcome: Progressing  Goal: Incisions, wounds, or drain sites healing without S/S of infection  Outcome: Progressing  Goal: Oral mucous membranes remain intact  Outcome: Progressing     Problem: Gastrointestinal - Adult  Goal: Maintains adequate nutritional intake  Outcome: Progressing  Goal: Establish and maintain optimal ostomy function  Outcome: Progressing

## 2025-04-24 NOTE — PROGRESS NOTES
Clinical Pharmacy Note  Heparin Dosing       Lab Results   Component Value Date/Time    ANTIXAUHEP >1.10 04/24/2025 04:08 AM      Lab Results   Component Value Date/Time    HGB 13.7 04/24/2025 04:08 AM    HCT 40.7 04/24/2025 04:08 AM     04/24/2025 04:08 AM    INR 0.99 03/22/2018 10:50 AM       Current Infusion Rate: 1300 units/hr    Plan:  Hold for 1 hour  Rate: 1080 units/hr  Next anti-Xa level: 1200 04/24/25    Pharmacy will continue to monitor and adjust based on anti-Xa results.    Kristy Ryder, PharmD

## 2025-04-24 NOTE — PROGRESS NOTES
Consult requested for PE   [facility] is on call for PE tonight  I will place a consult for Dr. Cohen and defer to his recommendation    Quinn Gonsalez MD [facility]   General, Interventional Cardiology, and Peripheral Vascular Disease   [facility]   (O): [phone]  (F): [phone]

## 2025-04-24 NOTE — CARE COORDINATION
Case Management Assessment  Initial Evaluation    Date/Time of Evaluation: 4/24/2025 1:54 PM  Assessment Completed by: SILVANA Tavera    If patient is discharged prior to next notation, then this note serves as note for discharge by case management.    Patient Name: Leigh De La Cruz                   YOB: 1955  Diagnosis: Goals of care, counseling/discussion [Z71.89]  Pulmonary embolism without acute cor pulmonale, unspecified chronicity, unspecified pulmonary embolism type (HCC) [I26.99]  Pulmonary embolism, other, unspecified chronicity, unspecified whether acute cor pulmonale present (HCC) [I26.99]                   Date / Time: 4/23/2025  5:58 PM    Patient Admission Status: Inpatient   Readmission Risk (Low < 19, Mod (19-27), High > 27): Readmission Risk Score: 5.3    Current PCP: Jeramie Steiner MD  PCP verified by CM? Yes    Chart Reviewed: Yes      History Provided by: Patient  Patient Orientation: Alert and Oriented    Patient Cognition: Alert    Hospitalization in the last 30 days (Readmission):  No    If yes, Readmission Assessment in CM Navigator will be completed.    Advance Directives:      Code Status: Full Code   Patient's Primary Decision Maker is: Legal Next of Kin    Primary Decision Maker: Jackie De La Cruz - Child - [phone]    Discharge Planning:    Patient lives with: Alone Type of Home: House  Primary Care Giver: Self  Patient Support Systems include: Family Members   Current Financial resources: Medicare  Current community resources: None  Current services prior to admission: None            Current DME:              Type of Home Care services:  None    ADLS  Prior functional level: Independent in ADLs/IADLs  Current functional level: Independent in ADLs/IADLs    PT AM-PAC:   /24  OT AM-PAC:   /24    Family can provide assistance at DC: No  Would you like Case Management to discuss the discharge plan with any other family members/significant others, and if so, who? No  Plans

## 2025-04-24 NOTE — PROGRESS NOTES
CLINICAL PHARMACY NOTE: MEDS TO BEDS    Total # of Prescriptions Filled: 1   The following medications were delivered to the patient:  Current Discharge Medication List        START taking these medications    Details   apixaban (ELIQUIS) 5 MG TABS tablet Take 2 tablets by mouth 2 times daily for 7 days, THEN 1 tablet 2 times daily.  Qty: 74 tablet, Refills: 3                 Additional Documentation: picked up by patient

## 2025-04-24 NOTE — PROGRESS NOTES
Clinical Pharmacy Note  Heparin Dosing Consult    Leigh De La Cruz is a 69 y.o. female ordered heparin per VTE/DVT/PE Nomogram by SCAR Shaffer.     Lab Results   Component Value Date/Time    APTT 29.9 02/25/2012 01:42 PM     Lab Results   Component Value Date/Time    HGB 14.4 04/23/2025 06:26 PM    HCT 41.3 04/23/2025 06:26 PM     04/23/2025 06:26 PM    INR 0.99 03/22/2018 10:50 AM       Ht Readings from Last 1 Encounters:   04/23/25 1.702 m (5' 7\")        Wt Readings from Last 1 Encounters:   04/23/25 72.6 kg (160 lb)        Assessment/Plan:  Initial bolus: 5800 units  Initial infusion rate: 1300 units/hr  Next anti-Xa: 0600 04/24/25    Pharmacy will continue to monitor adjust heparin based on anti-Xa results using nomogram below:     VTE/DVT/PE Heparin Nomogram     Initial Bolus: 80 units/kg Max Bolus: 10,000 units       Initial Rate: 18 units/kg/hr Max Initial Rate: 2,100 units/hr     anti-Xa Bolus Titration   < 0.1 Heparin 80 units/kg bolus Increase drip by 4 units/kg/hr   0.1 - 0.29 Heparin 40 units/kg bolus Increase drip by 2 units/kg/hr   0.3 - 0.7 No Bolus No Change   0.71 - 0.8 No Bolus Decrease drip by 1 units/kg/hr   0.81 - 0.99 No Bolus Decrease drip by 2 units/kg/hr   > 1 Hold Heparin for 1 hour Decrease drip by 3 units/kg/hr       Obtain anti-Xa 6 hours after initial bolus and 6 hours after any dose change until two consecutive therapeutic anti-Xa levels are achieved - then daily.    Kristy Ryder, AngelD

## 2025-04-24 NOTE — CONSULTS
Consult requested for PE   Vascular Sx is on call for PE tonight  I will place a consult for Dr. Cohen and defer to his recommendation    Quinn Gonsalez MD [facility]   General, Interventional Cardiology, and Peripheral Vascular Disease   [facility]   (O): [phone]  (F): [phone]  
    04/24/2025 04:08 AM     Lab Results   Component Value Date/Time     04/24/2025 04:08 AM    K 3.5 04/24/2025 04:08 AM     04/24/2025 04:08 AM    CO2 23 04/24/2025 04:08 AM    PHOS 2.5 11/20/2013 05:54 AM    BUN 15 04/24/2025 04:08 AM    CREATININE 0.7 04/24/2025 04:08 AM      No results found for: \"GLU\"    Scheduled Meds:    citalopram  40 mg Oral Daily    sodium chloride flush  5-40 mL IntraVENous 2 times per day     Continuous Infusions:    heparin (PORCINE) Infusion 1,080 Units/hr (04/24/25 0644)    sodium chloride         Imaging:   CT PE - 4/23/2025  IMPRESSION:  Middle lobe segmental pulmonary embolus.    Assessment:  -Chest Pain prior to admission - subsided prior to presentation to ED and no Chest Pain/Shortness of Breath since admission  -Right Middle Lobe segmental PE  -Mild RLE swelling - non-tender, motor/sensory intact  -Bilateral DP palpable  -Remote history of Rectal and Lung Cancer  -BLE Venous Duplex with Right PT DVT    Plan:   -No Vascular Surgery intervention planned at this time  -Continue Heparin gtt with transition to DOAC at D/C  -Consult Oncology as pt has a seemingly unprovoked DVT with a cancer history  -Okay for D/C from Vascular Surgery standpoint  -F/U with Dr. Cohen in 4 weeks in office    All pertinent information and plan of care discussed with Dr. Christian Cohen.    All questions and concerns were addressed with the patient. I have discussed the above stated plan with the patient and the nurse. The patient verbalized understanding and agreed with the plan.    Thank you for allowing to us to participate in the care of Leigh De La Cruz      Electronically signed by SCAR Rogers CNP on 4/24/2025 at 9:57 AM      Vascular Staff    I independently performed an evaluation on Leigh De La Cruz.  I have reviewed the above documentation completed by LULA Rogers.  Please see my additional contributions to the HPI, physical exam, assessment, and medical

## 2025-04-24 NOTE — ED PROVIDER NOTES
work-up, results, and treatment. Dr. Steiner recommends/agrees with admission and will place admission orders.     [LL]      ED Course User Index  [LL] Elliott Shaffer, APRN - CNP        Chronic Conditions affecting care: None  Mrs. 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    CONSULTS: (Who and What was discussed)  Cardiology-cardiology NP Eulalio  PCP-Dr. Steiner      Social Determinants Significantly Affecting Health : None    Records Reviewed (External, Source and Summary) none    CC/HPI Summary, DDx, ED Course, and Reassessment: Patient presents to emergency department for evaluation of acute onset at 1700 hrs. with mid substernal chest pain accompanied by nausea and shortness of breath.  Patient had negative troponins x 2, no ischemic EKG changes, and reassuring CBC, CMP, and coagulation studies.  EKG shows no acute abnormality and CXR also negative for acute findings.  Patient had elevated D-dimer and therefore obtained a CT PE study which revealed a middle lobe segmental pulmonary embolus.  Received call from [place] [facility] and spoke with radiologist regarding the positive finding.  Consulted to [facility] team-spoke with cardiology NP Eulalio who recommends initiating the patient on heparin and admit her to the hospital service with his service to follow in the a.m.  Discussed need to admit and heparinize with the patient.  She is agreeable with the plan for admission.  We discussed goals of care and patient will be a full code.  I therefore consulted to Dr. Steiner who placed admission orders.    Disposition Considerations (tests considered but not done, Admit vs D/C, Shared Decision Making, Pt Expectation of Test or Tx.): Inappropriate for outpatient

## 2025-04-25 ENCOUNTER — CARE COORDINATION (OUTPATIENT)
Dept: CASE MANAGEMENT | Age: 70
End: 2025-04-25

## 2025-04-25 ENCOUNTER — TELEPHONE (OUTPATIENT)
Dept: INTERNAL MEDICINE CLINIC | Age: 70
End: 2025-04-25

## 2025-04-25 DIAGNOSIS — I26.99 PULMONARY EMBOLISM WITHOUT ACUTE COR PULMONALE, UNSPECIFIED CHRONICITY, UNSPECIFIED PULMONARY EMBOLISM TYPE (HCC): Primary | ICD-10-CM

## 2025-04-25 PROCEDURE — 1111F DSCHRG MED/CURRENT MED MERGE: CPT | Performed by: STUDENT IN AN ORGANIZED HEALTH CARE EDUCATION/TRAINING PROGRAM

## 2025-04-25 NOTE — CARE COORDINATION
with patient. The patient was given an opportunity to ask questions; all questions answered at this time.. The patient verbalized understanding.   Were discharge instructions available to patient? Yes.   Reviewed appropriate site of care based on symptoms and resources available to patient including: PCP  Urgent care clinics  When to call 911. The patient agrees to contact the primary care provider and/or specialist office for questions related to their healthcare.      Advance Care Planning:   Does patient have an Advance Directive: not on file; education provided - . .    Medication Reconciliation:  Medication reconciliation was performed with patient,1111F entered: yes.     Remote Patient Monitoring:  Offered patient enrollment in the Remote Patient Monitoring (RPM) program for in-home monitoring: Patient is not eligible for RPM program because: patient does not have qualifying diagnosis.    Assessments:  Care Transitions 24 Hour Call    Do you have a copy of your discharge instructions?: Yes  Do you have all of your prescriptions and are they filled?: Yes  Have you been contacted by a [facility] Pharmacist?: No  Have you scheduled your follow up appointment?: No  Do you feel like you have everything you need to keep you well at home?: Yes  Are you an active caregiver in your home?: No  Care Transitions Interventions          Follow Up Appointment:   Patient does not have a follow up appointment scheduled at time of call. CTN was unable to find a hospital follow up within 7 days, will router a message to the office.  She prefers to see her PCP.    Future Appointments         Provider Specialty Dept Phone    5/19/2025 1:45 PM Christian Cohen MD Vascular Surgery [phone]            Care Transition Nurse provided contact information.  Plan for follow-up call in 6-10 days based on severity of symptoms and risk factors.  Plan for next call: symptom management-.  self management-.  follow-up appointment-.      NOAH MCNEILL

## 2025-04-25 NOTE — TELEPHONE ENCOUNTER
Pt called, she is requesting a return to work note, to go back tomorrow, April 26 or Sunday April 27    Please advise    Thank you

## 2025-04-29 ENCOUNTER — CARE COORDINATION (OUTPATIENT)
Dept: CASE MANAGEMENT | Age: 70
End: 2025-04-29

## 2025-04-29 NOTE — PROGRESS NOTES
Post-Discharge Transitional Care  Follow Up      Leigh De La Cruz   YOB: 1955    Date of Office Visit:  4/30/2025  Date of Hospital Admission: 4/23/25  Date of Hospital Discharge: 4/24/25  Risk of hospital readmission (high >=14%. Medium >=10%) :Readmission Risk Score: 5.4      Care management risk score Rising risk (score 2-5) and Complex Care (Scores >=6): No Risk Score On File     Non face to face  following discharge, date last encounter closed (first attempt may have been earlier): 04/25/2025    Call initiated 2 business days of discharge: Yes    ASSESSMENT/PLAN:   Hospital discharge follow-up  -     FL DISCHARGE MEDS RECONCILED W/ CURRENT OUTPATIENT MED LIST      Medical Decision Making: low complexity  No follow-ups on file.    On this date 4/30/2025 I have spent 20 minutes reviewing previous notes, test results and face to face with the patient discussing the diagnosis and importance of compliance with the treatment plan as well as documenting on the day of the visit.       Subjective:   HPI:  Follow up of Hospital problems/diagnosis(es): Pulmonary embolism without acute cor pulmonale.    Patient is a 69 y.o. female who presented to the ED on 4/23/2025 with complaints of Chest Pain. She has a significant PMH of Appendicitis, Arthritis, Rectal and Lung CA, Colostomy, and LLE Venous Surgery. The patient tells me she experienced a brief episode of Chest Pain yesterday which subsided by the time she presented to the ED. She denies chest pain, shortness of breath or dizziness since presenting to the hospital. She reports a history of Rectal Cancer (2012) and Lung Cancer (2015) and tells me she follows with Dr. Clarke every 6 months for this. She denies any recent travel or surgeries and denies any personal history of DVT. She believes her mother may have had blood clots. She underwent CT in the ED which was notable for PE and she was started on a Heparin gtt.     -Right Middle Lobe segmental

## 2025-04-30 ENCOUNTER — OFFICE VISIT (OUTPATIENT)
Dept: INTERNAL MEDICINE CLINIC | Age: 70
End: 2025-04-30

## 2025-04-30 VITALS
BODY MASS INDEX: 26.5 KG/M2 | DIASTOLIC BLOOD PRESSURE: 62 MMHG | OXYGEN SATURATION: 98 % | WEIGHT: 169.2 LBS | HEART RATE: 70 BPM | SYSTOLIC BLOOD PRESSURE: 122 MMHG

## 2025-04-30 DIAGNOSIS — Z09 HOSPITAL DISCHARGE FOLLOW-UP: Primary | ICD-10-CM

## 2025-04-30 SDOH — ECONOMIC STABILITY: FOOD INSECURITY: WITHIN THE PAST 12 MONTHS, THE FOOD YOU BOUGHT JUST DIDN'T LAST AND YOU DIDN'T HAVE MONEY TO GET MORE.: NEVER TRUE

## 2025-04-30 SDOH — ECONOMIC STABILITY: FOOD INSECURITY: WITHIN THE PAST 12 MONTHS, YOU WORRIED THAT YOUR FOOD WOULD RUN OUT BEFORE YOU GOT MONEY TO BUY MORE.: NEVER TRUE

## 2025-04-30 ASSESSMENT — PATIENT HEALTH QUESTIONNAIRE - PHQ9
SUM OF ALL RESPONSES TO PHQ QUESTIONS 1-9: 0
1. LITTLE INTEREST OR PLEASURE IN DOING THINGS: NOT AT ALL
2. FEELING DOWN, DEPRESSED OR HOPELESS: NOT AT ALL

## 2025-05-01 ENCOUNTER — CARE COORDINATION (OUTPATIENT)
Dept: CASE MANAGEMENT | Age: 70
End: 2025-05-01

## 2025-05-01 NOTE — CARE COORDINATION
Care Transitions Note    Follow Up Call     Patient: Leigh De La Cruz                                 Patient : 1955   MRN: [id number]                             Reason for Admission: PE  Discharge Date: 25       RURS: Readmission Risk Score: 5.4    Attempted to reach patient for transitions of care follow up.  Unable to reach patient.      Outreach Attempts:   HIPAA compliant voicemail left for patient.     Care Summary Note: Follow up outreach call attempt, no answer.  CTN left VM with contact information and request for return call.  CTN will continue with outreach call attempts.      Follow Up Appointment:   Future Appointments         Provider Specialty Dept Phone    2025 1:20 PM (Arrive by 12:05 PM) [place] [id number] Radiology [phone]    2025 1:45 PM Christian Cohen MD Vascular Surgery [phone]            Plan for follow-up call in 6-10 days based on severity of symptoms and risk factors. Plan for next call: symptom management-.  self management-.  follow-up appointment-.    NOAH POPE RN

## 2025-05-02 ENCOUNTER — HOSPITAL ENCOUNTER (OUTPATIENT)
Dept: CT IMAGING | Age: 70
Discharge: HOME | End: 2025-05-02
Attending: INTERNAL MEDICINE
Payer: MEDICARE

## 2025-05-02 DIAGNOSIS — C20 MALIGNANT NEOPLASM OF RECTUM (HCC): ICD-10-CM

## 2025-05-02 PROCEDURE — 6360000004 HC RX CONTRAST MEDICATION: Performed by: INTERNAL MEDICINE

## 2025-05-02 PROCEDURE — 71260 CT THORAX DX C+: CPT

## 2025-05-02 RX ORDER — IOPAMIDOL 612 MG/ML
50 INJECTION, SOLUTION INTRAVASCULAR
Status: COMPLETED | OUTPATIENT
Start: 2025-05-02 | End: 2025-05-02

## 2025-05-02 RX ORDER — IOPAMIDOL 755 MG/ML
75 INJECTION, SOLUTION INTRAVASCULAR
Status: COMPLETED | OUTPATIENT
Start: 2025-05-02 | End: 2025-05-02

## 2025-05-02 RX ADMIN — IOPAMIDOL 75 ML: 755 INJECTION, SOLUTION INTRAVENOUS at 13:17

## 2025-05-02 RX ADMIN — IOPAMIDOL 50 ML: 612 INJECTION, SOLUTION INTRAVENOUS at 13:18

## 2025-05-08 ENCOUNTER — CARE COORDINATION (OUTPATIENT)
Dept: CASE MANAGEMENT | Age: 70
End: 2025-05-08

## 2025-05-08 NOTE — CARE COORDINATION
Care Transitions Note    Follow Up Call     Patient: Leigh De La Cruz                                 Patient : 1955   MRN: [id number]                             Reason for Admission: PE  Discharge Date: 25       RURS: Readmission Risk Score: 5.4    Second & final attempt:  Attempted to reach patient for transitions of care follow up.  Unable to reach patient.      Outreach Attempts:   HIPAA compliant voicemail left for patient.     Care Summary Note: Follow up outreach call attempt, no answer.  CTN left VM with contact information and request for return call.  CTN will close program & remain available.      Follow Up Appointment:   Future Appointments         Provider Specialty Dept Phone    2025 1:45 PM Christian Cohen MD Vascular Surgery [phone]            No further follow-up call indicated     NOAH POPE RN

## 2025-05-23 RX ORDER — PAROXETINE 20 MG/1
20 TABLET, FILM COATED ORAL DAILY
Qty: 90 TABLET | Refills: 3 | Status: SHIPPED | OUTPATIENT
Start: 2025-05-23

## 2025-05-23 NOTE — TELEPHONE ENCOUNTER
Future Appointments   Date Time Provider Department Center   6/9/2025  2:00 PM Christian Cohen MD [facility]

## 2025-05-30 ENCOUNTER — OFFICE VISIT (OUTPATIENT)
Dept: INTERNAL MEDICINE CLINIC | Age: 70
End: 2025-05-30

## 2025-05-30 VITALS
WEIGHT: 168 LBS | BODY MASS INDEX: 26.31 KG/M2 | HEART RATE: 87 BPM | OXYGEN SATURATION: 97 % | SYSTOLIC BLOOD PRESSURE: 130 MMHG | DIASTOLIC BLOOD PRESSURE: 72 MMHG | TEMPERATURE: 98.6 F

## 2025-05-30 DIAGNOSIS — J20.9 ACUTE BRONCHITIS, UNSPECIFIED ORGANISM: Primary | ICD-10-CM

## 2025-05-30 LAB
INFLUENZA A ANTIGEN, POC: NEGATIVE
INFLUENZA B ANTIGEN, POC: NEGATIVE
LOT EXPIRE DATE: NORMAL
LOT KIT NUMBER: NORMAL
SARS-COV-2 RNA, POC: NEGATIVE
VALID INTERNAL CONTROL: NORMAL
VENDOR AND KIT NAME POC: NORMAL

## 2025-05-30 RX ORDER — BENZONATATE 100 MG/1
100 CAPSULE ORAL 3 TIMES DAILY PRN
Qty: 21 CAPSULE | Refills: 0 | Status: SHIPPED | OUTPATIENT
Start: 2025-05-30 | End: 2025-06-06

## 2025-05-30 RX ORDER — METHYLPREDNISOLONE 4 MG/1
TABLET ORAL
Qty: 1 KIT | Refills: 0 | Status: SHIPPED | OUTPATIENT
Start: 2025-05-30 | End: 2025-06-05

## 2025-05-30 RX ORDER — FLUTICASONE PROPIONATE 50 MCG
2 SPRAY, SUSPENSION (ML) NASAL DAILY
Qty: 16 G | Refills: 0 | Status: SHIPPED | OUTPATIENT
Start: 2025-05-30

## 2025-05-30 ASSESSMENT — ENCOUNTER SYMPTOMS
ABDOMINAL PAIN: 0
DIARRHEA: 0
RHINORRHEA: 1
SHORTNESS OF BREATH: 0
COLOR CHANGE: 0
VOMITING: 0
NAUSEA: 0
WHEEZING: 0
CONSTIPATION: 0
VOICE CHANGE: 0
SORE THROAT: 1
COUGH: 1

## 2025-05-30 ASSESSMENT — PATIENT HEALTH QUESTIONNAIRE - PHQ9
1. LITTLE INTEREST OR PLEASURE IN DOING THINGS: NOT AT ALL
SUM OF ALL RESPONSES TO PHQ QUESTIONS 1-9: 0
SUM OF ALL RESPONSES TO PHQ QUESTIONS 1-9: 0
2. FEELING DOWN, DEPRESSED OR HOPELESS: NOT AT ALL
SUM OF ALL RESPONSES TO PHQ QUESTIONS 1-9: 0
SUM OF ALL RESPONSES TO PHQ QUESTIONS 1-9: 0

## 2025-05-30 NOTE — PROGRESS NOTES
Leigh De La Cruz (:  1955) is a 69 y.o. female,Established patient, here for evaluation of the following chief complaint(s):  Cough    Leigh is a 69-year-old female with past medical history as noted below.    Active Ambulatory Problems     Diagnosis Date Noted    Acute appendicitis 2013    Nausea & vomiting 2014    Dental abscess 2014    Pulmonary nodules 2014    Enteritis 2015    Intractable nausea and vomiting 2015    Nausea     History of rectal cancer 2015    Dizziness 2015    Speech disturbance 2015    Right otitis media 2017    Acute non-recurrent maxillary sinusitis 2017    Allergic conjunctivitis of both eyes 2017    Neck pain 10/09/2020    Cervical radiculopathy 2020    Polyarthralgia 2020    Peristomal skin complication 2021    Right shoulder tendonitis 2021    Stoma dermatitis 2024    Colostomy complication (HCC) 2024    Colostomy status ([facility]) 2024    Pulmonary embolism without acute cor pulmonale, unspecified chronicity, unspecified pulmonary embolism type ([facility]) 2025     Resolved Ambulatory Problems     Diagnosis Date Noted    Rectal cancer metastasized to lung (HCC) 2013    Malignant neoplasm of rectum ([facility]) 2014    Encounter to discuss test results 2016    Rectal cancer (HCC) 2017    Colostomy care ([facility]) 2021     Past Medical History:   Diagnosis Date    Arthritis     Cancer ([facility])     Depression      She presents the office today for complaints of chest tightness, headache, raw throat, sore throat, tactile fever, and cough that started on 3/27. Denies nausea, vomiting, diarrhea, abdominal pain, or chest pain. Does report ill exposures about a week ago. She has taken mucinex and vicks cold and flu without relief in her symptoms. Nothing seems to make symptoms better or worse per report.     Vitals:    25 1505   BP: 130/72   Pulse: 87

## 2025-06-09 ENCOUNTER — OFFICE VISIT (OUTPATIENT)
Dept: VASCULAR SURGERY | Age: 70
End: 2025-06-09

## 2025-06-09 VITALS — BODY MASS INDEX: 26.37 KG/M2 | HEIGHT: 67 IN | WEIGHT: 168 LBS

## 2025-06-09 DIAGNOSIS — I26.99 PULMONARY EMBOLISM WITHOUT ACUTE COR PULMONALE, UNSPECIFIED CHRONICITY, UNSPECIFIED PULMONARY EMBOLISM TYPE (HCC): Primary | ICD-10-CM

## 2025-06-09 NOTE — PROGRESS NOTES
[facility] Endovascular Surgery  Christian Cohen MD, RPVI      Chief Complaint:   Chief Complaint   Patient presents with    Follow-up     1 month follow up for PE/DVT, doing great        follow up of a pre-existing problem    HPI  Leigh De La Cruz is a 69 y.o. female who is being seen for follow-up DVT/PE.  Patient is doing well has no acute complaints or concerns.  She has no breathing issues since leaving the hospital.  She notes her right lower extremity mild edema has improved.  Patient saw her oncologist, Dr. Clarke,  Who has noted no recurrence of her rectal cancer and feels she is safe for 6 months of anticoagulation from this DVT.  Patient notes significant expense of her medication.    PMH  Past Medical History:   Diagnosis Date    Acute appendicitis 11/19/2013    Arthritis     Cancer (HCC)     Rectal, lung    Colostomy care (HCC) 2/23/2021    Depression     Malignant neoplasm of rectum (HCC) 12/29/2014    Malignant neoplasm of rectum (HCC) 12/29/2014    Peristomal skin complication 2/23/2021       PSH    Past Surgical History:   Procedure Laterality Date    ABDOMEN SURGERY      rectal ca    APPENDECTOMY      LAPAROSCOPIC APPENDECTOMY N/A 11/19/13    attempted open appy done    MANDIBLE SURGERY      overbite repair    [place] COLOSTOMY/SKIN LEVEL CECOSTOMY      TUNNELED VENOUS PORT PLACEMENT Left 01/30/2012    Smart Port    VARICOSE VEIN SURGERY Left        Med  Current Outpatient Medications   Medication Sig Dispense Refill    apixaban (ELIQUIS) 5 MG TABS tablet Take 1 tablet by mouth 2 times daily 90 tablet 3    fluticasone (FLONASE) 50 MCG/ACT nasal spray 2 sprays by Each Nostril route daily 16 g 0    PARoxetine (PAXIL) 20 MG tablet Take 1 tablet by mouth once daily 90 tablet 3    citalopram (CELEXA) 40 MG tablet TAKE ONE TABLET BY MOUTH ONCE DAILY 30 tablet 3    Multiple Vitamins-Minerals (THERAPEUTIC MULTIVITAMIN-MINERALS) tablet Take 1 tablet by mouth daily       No current